# Patient Record
Sex: MALE | Race: WHITE | NOT HISPANIC OR LATINO | Employment: OTHER | URBAN - METROPOLITAN AREA
[De-identification: names, ages, dates, MRNs, and addresses within clinical notes are randomized per-mention and may not be internally consistent; named-entity substitution may affect disease eponyms.]

---

## 2023-03-14 LAB
ALANINE AMINOTRANSFERASE (SGPT) (U/L) IN SER/PLAS: 6 U/L (ref 10–52)
ALBUMIN (G/DL) IN SER/PLAS: 3.9 G/DL (ref 3.4–5)
ALKALINE PHOSPHATASE (U/L) IN SER/PLAS: 106 U/L (ref 33–136)
ANION GAP IN SER/PLAS: 14 MMOL/L (ref 10–20)
ASPARTATE AMINOTRANSFERASE (SGOT) (U/L) IN SER/PLAS: 25 U/L (ref 9–39)
BASOPHILS (10*3/UL) IN BLOOD BY AUTOMATED COUNT: 0.06 X10E9/L (ref 0–0.1)
BASOPHILS/100 LEUKOCYTES IN BLOOD BY AUTOMATED COUNT: 0.9 % (ref 0–2)
BILIRUBIN TOTAL (MG/DL) IN SER/PLAS: 0.7 MG/DL (ref 0–1.2)
CALCIDIOL (25 OH VITAMIN D3) (NG/ML) IN SER/PLAS: 38 NG/ML
CALCIUM (MG/DL) IN SER/PLAS: 9.5 MG/DL (ref 8.6–10.3)
CARBON DIOXIDE, TOTAL (MMOL/L) IN SER/PLAS: 26 MMOL/L (ref 21–32)
CHLORIDE (MMOL/L) IN SER/PLAS: 101 MMOL/L (ref 98–107)
CREATININE (MG/DL) IN SER/PLAS: 1.24 MG/DL (ref 0.5–1.3)
EOSINOPHILS (10*3/UL) IN BLOOD BY AUTOMATED COUNT: 0.7 X10E9/L (ref 0–0.4)
EOSINOPHILS/100 LEUKOCYTES IN BLOOD BY AUTOMATED COUNT: 10.9 % (ref 0–6)
ERYTHROCYTE DISTRIBUTION WIDTH (RATIO) BY AUTOMATED COUNT: 12 % (ref 11.5–14.5)
ERYTHROCYTE MEAN CORPUSCULAR HEMOGLOBIN CONCENTRATION (G/DL) BY AUTOMATED: 31 G/DL (ref 32–36)
ERYTHROCYTE MEAN CORPUSCULAR VOLUME (FL) BY AUTOMATED COUNT: 103 FL (ref 80–100)
ERYTHROCYTES (10*6/UL) IN BLOOD BY AUTOMATED COUNT: 3.17 X10E12/L (ref 4.5–5.9)
GFR MALE: 59 ML/MIN/1.73M2
GLUCOSE (MG/DL) IN SER/PLAS: 89 MG/DL (ref 74–99)
HEMATOCRIT (%) IN BLOOD BY AUTOMATED COUNT: 32.6 % (ref 41–52)
HEMOGLOBIN (G/DL) IN BLOOD: 10.1 G/DL (ref 13.5–17.5)
IMMATURE GRANULOCYTES/100 LEUKOCYTES IN BLOOD BY AUTOMATED COUNT: 0.2 % (ref 0–0.9)
LEUKOCYTES (10*3/UL) IN BLOOD BY AUTOMATED COUNT: 6.4 X10E9/L (ref 4.4–11.3)
LYMPHOCYTES (10*3/UL) IN BLOOD BY AUTOMATED COUNT: 0.72 X10E9/L (ref 0.8–3)
LYMPHOCYTES/100 LEUKOCYTES IN BLOOD BY AUTOMATED COUNT: 11.2 % (ref 13–44)
MONOCYTES (10*3/UL) IN BLOOD BY AUTOMATED COUNT: 0.64 X10E9/L (ref 0.05–0.8)
MONOCYTES/100 LEUKOCYTES IN BLOOD BY AUTOMATED COUNT: 10 % (ref 2–10)
NEUTROPHILS (10*3/UL) IN BLOOD BY AUTOMATED COUNT: 4.3 X10E9/L (ref 1.6–5.5)
NEUTROPHILS/100 LEUKOCYTES IN BLOOD BY AUTOMATED COUNT: 66.8 % (ref 40–80)
PLATELETS (10*3/UL) IN BLOOD AUTOMATED COUNT: 442 X10E9/L (ref 150–450)
POTASSIUM (MMOL/L) IN SER/PLAS: 4 MMOL/L (ref 3.5–5.3)
PROTEIN TOTAL: 7.1 G/DL (ref 6.4–8.2)
SODIUM (MMOL/L) IN SER/PLAS: 137 MMOL/L (ref 136–145)
UREA NITROGEN (MG/DL) IN SER/PLAS: 21 MG/DL (ref 6–23)

## 2023-03-15 LAB
ALLERGEN ANIMAL: CAT DANDER IGE (KU/L): 0.22 KU/L
ALLERGEN ANIMAL: DOG DANDER IGE (KU/L): 0.47 KU/L
ALLERGEN GRASS: BERMUDA GRASS (CYNODON DACTYLON) IGE (KU/L): 0.65 KU/L
ALLERGEN GRASS: JOHNSON GRASS (SORGHUM HALEPENSE) IGE (KU/L): 0.64 KU/L
ALLERGEN GRASS: MEADOW GRASS, KENTUCKY BLUE (POA PRATENSIS )IGE (KU/L): 0.68 KU/L
ALLERGEN GRASS: TIMOTHY GRASS (PHLEUM PRATENSE) IGE (KU/L): 0.65 KU/L
ALLERGEN INSECT: COCKROACH IGE: 0.57 KU/L
ALLERGEN MICROORGANISM: ALTERNARIA ALTERNATA IGE (KU/L): 0.44 KU/L
ALLERGEN MICROORGANISM: ASPERGILLUS FUMIGATUS IGE (KU/L): 0.34 KU/L
ALLERGEN MICROORGANISM: CLADOSPORIUM HERBARUM IGE (KU/L): 0.53 KU/L
ALLERGEN MICROORGANISM: PENICILLIUM CHRYSOGENUM (P. NOTATUM) IGE (KU/L): 0.3 KU/L
ALLERGEN MITE: DERMATOPHAGOIDES FARINAE (HOUSE DUST MITE) IGE (KU/L): 0.73 KU/L
ALLERGEN MITE: DERMATOPHAGOIDES PTERONYSSINUS (HOUSE DUST MITE) IGE (KU/L): 0.83 KU/L
ALLERGEN TREE: BOX-ELDER (ACER NEGUNDO) IGE (KU/L): 0.47 KU/L
ALLERGEN TREE: COMMON SILVER BIRCH (BETULA VERRUCOSA) IGE (KU/L): 0.39 KU/L
ALLERGEN TREE: COTTONWOOD (POPULUS DELTOIDES) IGE (KU/L): 0.96 KU/L
ALLERGEN TREE: ELM (ULMUS AMERICANA) IGE (KU/L): 0.59 KU/L
ALLERGEN TREE: MAPLE LEAF SYCAMORE, LONDON PLANE IGE (KU/L): 0.44 KU/L
ALLERGEN TREE: MOUNTAIN JUNIPER (JUNIPERUS SABINOIDES) IGE (KU/L): 0.5 KU/L
ALLERGEN TREE: MULBERRY (MORUS ALBA) IGE (KU/L): 0.48 KU/L
ALLERGEN TREE: OAK (QUERCUS ALBA) IGE (KU/L): 0.37 KU/L
ALLERGEN TREE: PECAN, HICKORY (CARYA PECAN) IGE (KU/L): 0.36 KU/L
ALLERGEN TREE: WALNUT IGE: 0.45 KU/L
ALLERGEN TREE: WHITE ASH (FRAXINUS AMERICANA) IGE (KU/L): 0.46 KU/L
ALLERGEN WEED: COMMON PIGWEED (AMARANTHUS RETROFLEXUS) IGE (KU/L): 0.23 KU/L
ALLERGEN WEED: COMMON RAGWEED (AMB. ARTEMISIIFOLIA/A. ELATIOR) IGE (KU/L): 0.26 KU/L
ALLERGEN WEED: GOOSEFOOT, LAMB'S QUARTERS (CHENOPODIUM ALBUM) IGE (KU/L): 0.69 KU/L
ALLERGEN WEED: PLANTAIN (ENGLISH), RIBWORT (PLANTAGO LANCEOLATA) IGE (KU/L): 0.47 KU/L
ALLERGEN WEED: PRICKLY SALTWORT/RUSSIAN THISTLE (SALSOLA KALI) IGE (KU/L): 0.54 KU/L
ALLERGEN WEED: SHEEP SORREL (RUMEX ACETOSELLA) IGE (KU/L): 0.34 KU/L
ANA PATTERN: ABNORMAL
ANA TITER: ABNORMAL
ANTI-CENTROMERE: <0.2 AI
ANTI-CHROMATIN: <0.2 AI
ANTI-DNA (DS): <1 IU/ML
ANTI-JO-1 IGG: <0.2 AI
ANTI-NUCLEAR ANTIBODY (ANA): POSITIVE
ANTI-RIBOSOMAL P: <0.2 AI
ANTI-RNP: 2.2 AI
ANTI-SCL-70: <0.2 AI
ANTI-SM/RNP: <0.2 AI
ANTI-SM: <0.2 AI
ANTI-SSA: <0.2 AI
ANTI-SSB: <0.2 AI
IMMUNOCAP IGE: >5000 KU/L (ref 0–214)
IMMUNOCAP INTERPRETATION: ABNORMAL
THYROPEROXIDASE AB (IU/ML) IN SER/PLAS: 42 IU/ML

## 2023-03-16 LAB
IRON (UG/DL) IN SER/PLAS: 50 UG/DL (ref 35–150)
IRON BINDING CAPACITY (UG/DL) IN SER/PLAS: 377 UG/DL (ref 240–445)
IRON SATURATION (%) IN SER/PLAS: 13 % (ref 25–45)

## 2023-03-17 LAB
COBALAMIN (VITAMIN B12) (PG/ML) IN SER/PLAS: 354 PG/ML (ref 211–911)
FOLATE (NG/ML) IN SER/PLAS: 14 NG/ML
THYROGLOBULIN AB (IU/ML) IN SER/PLAS: <0.9 IU/ML (ref 0–4)

## 2023-03-20 LAB — TRYPTASE: 9.2 MCG/L

## 2023-03-21 LAB — URTICARIA-INDUCING ACTIVITY: 4.9

## 2023-09-20 ENCOUNTER — HOSPITAL ENCOUNTER (OUTPATIENT)
Dept: DATA CONVERSION | Facility: HOSPITAL | Age: 78
End: 2023-09-20
Attending: INTERNAL MEDICINE | Admitting: INTERNAL MEDICINE
Payer: MEDICARE

## 2023-09-20 DIAGNOSIS — I47.20 VENTRICULAR TACHYCARDIA, UNSPECIFIED (MULTI): ICD-10-CM

## 2023-09-20 DIAGNOSIS — Z95.810 PRESENCE OF AUTOMATIC (IMPLANTABLE) CARDIAC DEFIBRILLATOR: ICD-10-CM

## 2023-09-20 LAB
ANION GAP IN SER/PLAS: 11 MMOL/L (ref 10–20)
ATRIAL RATE: 72 BPM
CALCIUM (MG/DL) IN SER/PLAS: 9.4 MG/DL (ref 8.6–10.3)
CARBON DIOXIDE, TOTAL (MMOL/L) IN SER/PLAS: 26 MMOL/L (ref 21–32)
CHLORIDE (MMOL/L) IN SER/PLAS: 105 MMOL/L (ref 98–107)
CREATININE (MG/DL) IN SER/PLAS: 1.58 MG/DL (ref 0.5–1.3)
ERYTHROCYTE DISTRIBUTION WIDTH (RATIO) BY AUTOMATED COUNT: 14.4 % (ref 11.5–14.5)
ERYTHROCYTE MEAN CORPUSCULAR HEMOGLOBIN CONCENTRATION (G/DL) BY AUTOMATED: 31.5 G/DL (ref 32–36)
ERYTHROCYTE MEAN CORPUSCULAR VOLUME (FL) BY AUTOMATED COUNT: 93 FL (ref 80–100)
ERYTHROCYTES (10*6/UL) IN BLOOD BY AUTOMATED COUNT: 3.71 X10E12/L (ref 4.5–5.9)
GFR MALE: 44 ML/MIN/1.73M2
GLUCOSE (MG/DL) IN SER/PLAS: 100 MG/DL (ref 74–99)
HEMATOCRIT (%) IN BLOOD BY AUTOMATED COUNT: 34.6 % (ref 41–52)
HEMOGLOBIN (G/DL) IN BLOOD: 10.9 G/DL (ref 13.5–17.5)
LEUKOCYTES (10*3/UL) IN BLOOD BY AUTOMATED COUNT: 7.2 X10E9/L (ref 4.4–11.3)
P AXIS: 114 DEGREES
PLATELETS (10*3/UL) IN BLOOD AUTOMATED COUNT: 507 X10E9/L (ref 150–450)
POTASSIUM (MMOL/L) IN SER/PLAS: 4 MMOL/L (ref 3.5–5.3)
Q ONSET: 187 MS
QRS COUNT: 11 BEATS
QRS DURATION: 192 MS
QT INTERVAL: 538 MS
QTC CALCULATION(BAZETT): 589 MS
QTC FREDERICIA: 572 MS
R AXIS: -67 DEGREES
SODIUM (MMOL/L) IN SER/PLAS: 138 MMOL/L (ref 136–145)
T AXIS: 79 DEGREES
T OFFSET: 456 MS
UREA NITROGEN (MG/DL) IN SER/PLAS: 23 MG/DL (ref 6–23)
VENTRICULAR RATE: 72 BPM

## 2023-09-29 VITALS — HEIGHT: 74 IN | BODY MASS INDEX: 24.45 KG/M2 | WEIGHT: 190.48 LBS

## 2023-09-30 NOTE — H&P
History of Present Illness:   History Present Illness:  Reason for surgery: device at replacement   HPI:    He has permanent a fib.  His device is at replacement.    Allergies:        Allergies:  ·  chlorhexidine topical : Other  ·  spironolactone : Other    Home Medication Review:   Home Medications Reviewed: yes     Impression/Procedure:   ·  Impression and Planned Procedure: Device at replacement; ventricular tachycardia       ERAS (Enhanced Recovery After Surgery):  ·  ERAS Patient: no     Review of Systems:   Review of Systems:  Constitutional: NEGATIVE: Fever, Chills     Respiratory: NEGATIVE: Dry Cough, Productive Cough     Cardiac: NEGATIVE: Chest Pain, Dyspnea on Exertion,  Orthopnea, Palpitations, Syncope     All Other Systems: All other systems reviewed and  are negative       Physical Exam by System:    Constitutional: Well developed, awake/alert/oriented  x3, no distress, alert and cooperative   Eyes: EOMI, clear sclera   ENMT: mucous membranes moist, no apparent injury,  no lesions seen   Head/Neck: Neck supple, no thyromegaly, No JVD, trachea  midline   Respiratory/Thorax: Patent airways, CTA bilaterally,  no wheezing, normal breath sounds with good chest expansion, thorax symmetric   Cardiovascular: Laterally displaced  PMI, regular,  rate and rhythm, , normal S 1and S 2, no murmurs, 2+ equal pulses radial, brachial, DP pulses   Gastrointestinal: Nondistended, BS +,  no bruits,  soft, non-tender, no guarding   Genitourinary: deferred   Musculoskeletal: ROM intact, no joint swelling, normal  strength   Extremities: normal extremities; no cyanosis, edema,  or contusions, no clubbing   Neurological: alert and oriented x3, intact senses,  motor normal strength; normal gait   Breast: deferred   Lymphatic: No cervical lymphadenopathy   Psychological: Appropriate mood and behavior   Skin: Warm and dry, no lesions, no rashes; normal  device pocket     Airway/Sedation Assessment:  ·  Emotional Status calm    ·  Neurologic alert & oriented x 3   ·  Respiratory clear to auscultation   ·  Cardiovascular irregular   ·  GI/ soft, nontender     · Pulses present: Pedal Left, Pedal Right, Radial Left, Radial Right     ·  Mouth Opening OK yes   ·  Neck Flexibility OK yes   ·  Loose Teeth no   ·  Oropharyngeal Classification Class II   ·  ASA PS Classification ASA III   ·  Sedation Plan moderate sedation     Consent:   COVID-19 Consent:  ·  COVID-19 Risk Consent Surgeon has reviewed key risks related to the risk of darren COVID-19 and if they contract COVID-19 what the risks are.       Electronic Signatures:  Awa Gasca)  (Signed 20-Sep-2023 08:56)   Authored: History of Present Illness, Allergies, Home  Medication Review, Impression/Procedure, ERAS, Review of Systems, Physical Exam, Consent, Note Completion      Last Updated: 20-Sep-2023 08:56 by Awa Gasca)

## 2023-10-16 DIAGNOSIS — I25.10 ARTERIOSCLEROSIS OF CORONARY ARTERY: ICD-10-CM

## 2023-10-16 PROBLEM — I25.2 PAST MYOCARDIAL INFARCTION: Status: ACTIVE | Noted: 2023-10-16

## 2023-10-16 PROBLEM — I25.5 ISCHEMIC CARDIOMYOPATHY: Status: ACTIVE | Noted: 2023-10-16

## 2023-10-16 PROBLEM — E78.5 HYPERLIPIDEMIA: Status: ACTIVE | Noted: 2023-10-16

## 2023-10-16 PROBLEM — I49.5 SINUS NODE DYSFUNCTION (MULTI): Status: ACTIVE | Noted: 2023-10-16

## 2023-10-16 PROBLEM — R06.00 DYSPNEA: Status: ACTIVE | Noted: 2023-10-16

## 2023-10-16 PROBLEM — R53.1 WEAKNESS: Status: ACTIVE | Noted: 2023-10-16

## 2023-10-16 PROBLEM — I48.92 ATRIAL FLUTTER (MULTI): Status: ACTIVE | Noted: 2023-10-16

## 2023-10-16 PROBLEM — Z95.818 PRESENCE OF WATCHMAN LEFT ATRIAL APPENDAGE CLOSURE DEVICE: Status: ACTIVE | Noted: 2023-10-16

## 2023-10-16 PROBLEM — I51.3 LV (LEFT VENTRICULAR) MURAL THROMBUS: Status: ACTIVE | Noted: 2023-10-16

## 2023-10-16 PROBLEM — I50.20 SYSTOLIC CONGESTIVE HEART FAILURE (MULTI): Status: ACTIVE | Noted: 2023-10-16

## 2023-10-16 PROBLEM — Z95.810 ICD (IMPLANTABLE CARDIOVERTER-DEFIBRILLATOR) IN PLACE: Status: ACTIVE | Noted: 2023-10-16

## 2023-10-16 PROBLEM — I44.2 CHB (COMPLETE HEART BLOCK) (MULTI): Status: ACTIVE | Noted: 2023-10-16

## 2023-10-16 PROBLEM — I48.91 ATRIAL FIBRILLATION (MULTI): Status: ACTIVE | Noted: 2023-10-16

## 2023-10-16 PROBLEM — I50.22 CHRONIC SYSTOLIC CONGESTIVE HEART FAILURE, NYHA CLASS 2 (MULTI): Status: ACTIVE | Noted: 2023-10-16

## 2023-10-16 PROBLEM — N18.32 STAGE 3B CHRONIC KIDNEY DISEASE (MULTI): Status: ACTIVE | Noted: 2023-10-16

## 2023-10-16 PROBLEM — R76.8 ELEVATED IGE LEVEL: Status: ACTIVE | Noted: 2023-10-16

## 2023-10-16 PROBLEM — D64.9 ANEMIA: Status: ACTIVE | Noted: 2023-10-16

## 2023-10-16 PROBLEM — Z85.46 HISTORY OF PROSTATE CANCER: Status: ACTIVE | Noted: 2023-10-16

## 2023-10-16 PROBLEM — Z00.6 RESEARCH STUDY PATIENT: Status: ACTIVE | Noted: 2023-10-16

## 2023-10-16 PROBLEM — L20.9 ATOPIC DERMATITIS: Status: ACTIVE | Noted: 2023-10-16

## 2023-10-16 RX ORDER — ROSUVASTATIN CALCIUM 10 MG/1
10 TABLET, COATED ORAL DAILY
COMMUNITY
Start: 2023-09-12

## 2023-10-16 RX ORDER — VALSARTAN 40 MG/1
1 TABLET ORAL DAILY
COMMUNITY
Start: 2022-09-12

## 2023-10-16 RX ORDER — DUPILUMAB 300 MG/2ML
300 INJECTION, SOLUTION SUBCUTANEOUS
COMMUNITY

## 2023-10-16 RX ORDER — MONTELUKAST SODIUM 10 MG/1
10 TABLET ORAL NIGHTLY
COMMUNITY
Start: 2023-08-10

## 2023-10-16 RX ORDER — ZOLPIDEM TARTRATE 10 MG/1
10 TABLET ORAL NIGHTLY PRN
COMMUNITY
Start: 2023-07-31

## 2023-10-16 RX ORDER — EPLERENONE 25 MG/1
1 TABLET, FILM COATED ORAL DAILY
COMMUNITY
Start: 2022-06-30

## 2023-10-16 RX ORDER — OMEPRAZOLE 40 MG/1
1 CAPSULE, DELAYED RELEASE ORAL DAILY
COMMUNITY
Start: 2022-04-26

## 2023-10-16 RX ORDER — NITROGLYCERIN 0.4 MG/1
0.4 TABLET SUBLINGUAL EVERY 5 MIN PRN
COMMUNITY
Start: 2014-02-12 | End: 2023-10-16 | Stop reason: SDUPTHER

## 2023-10-16 RX ORDER — LATANOPROST 50 UG/ML
1 SOLUTION/ DROPS OPHTHALMIC NIGHTLY
COMMUNITY

## 2023-10-16 RX ORDER — TRAMADOL HYDROCHLORIDE 50 MG/1
50 TABLET ORAL EVERY 6 HOURS PRN
COMMUNITY
Start: 2023-08-23

## 2023-10-16 RX ORDER — CITALOPRAM 20 MG/1
30 TABLET, FILM COATED ORAL DAILY
COMMUNITY
Start: 2023-09-05

## 2023-10-16 RX ORDER — DONEPEZIL HYDROCHLORIDE 5 MG/1
1 TABLET, FILM COATED ORAL 2 TIMES DAILY
COMMUNITY

## 2023-10-16 RX ORDER — IPRATROPIUM BROMIDE 42 UG/1
2 SPRAY, METERED NASAL 3 TIMES DAILY
COMMUNITY

## 2023-10-16 RX ORDER — CARBIDOPA AND LEVODOPA 25; 100 MG/1; MG/1
1 TABLET, EXTENDED RELEASE ORAL 4 TIMES DAILY
COMMUNITY
Start: 2022-09-19

## 2023-10-16 RX ORDER — METOPROLOL SUCCINATE 25 MG/1
1 TABLET, EXTENDED RELEASE ORAL DAILY
COMMUNITY
Start: 2022-09-12 | End: 2023-11-08 | Stop reason: SDUPTHER

## 2023-10-16 RX ORDER — ASPIRIN 81 MG/1
81 TABLET ORAL EVERY OTHER DAY
COMMUNITY

## 2023-10-16 RX ORDER — FUROSEMIDE 20 MG/1
20 TABLET ORAL
COMMUNITY

## 2023-10-16 RX ORDER — RIVAROXABAN 20 MG/1
20 TABLET, FILM COATED ORAL
COMMUNITY
Start: 2023-08-27

## 2023-10-16 RX ORDER — LEVOTHYROXINE SODIUM 100 UG/1
1 TABLET ORAL DAILY
COMMUNITY
Start: 2022-09-12

## 2023-10-16 RX ORDER — BUSPIRONE HYDROCHLORIDE 15 MG/1
15 TABLET ORAL 3 TIMES DAILY
COMMUNITY
Start: 2023-09-12

## 2023-10-17 RX ORDER — NITROGLYCERIN 0.4 MG/1
0.4 TABLET SUBLINGUAL EVERY 5 MIN PRN
Qty: 90 TABLET | Refills: 3 | Status: SHIPPED | OUTPATIENT
Start: 2023-10-17

## 2023-10-24 ENCOUNTER — TELEPHONE (OUTPATIENT)
Dept: CARDIOLOGY | Facility: CLINIC | Age: 78
End: 2023-10-24
Payer: MEDICARE

## 2023-10-24 NOTE — TELEPHONE ENCOUNTER
Patient phoned left message stating having some shortness of breathe. No other information   Phone patient back left message to return call.

## 2023-10-25 NOTE — TELEPHONE ENCOUNTER
Patient left message on nursing line just states over and over having sob having sob. Phoned patient back when straight to . Left message to return call for more information.

## 2023-10-26 NOTE — TELEPHONE ENCOUNTER
Spoke with patient who states he has SOB on exertion on walking and taking steps. Patient thinks his heart is getting weaker.  Patient is post status with change of defibrillator 09/20/23. Patient states the SOB has been going on for over a month. Thinks some meds need changed.  He has an echo scheduled 11/06/2023. No other cardiac complaints at this time    Please advise

## 2023-11-01 NOTE — TELEPHONE ENCOUNTER
Patient called. States he does not appreciate the delay in return call. Advised we did go to a new computer system, so unfortunately there have been delays. Patient states he wants to see another cardiologist and mentioned Dr. Alexander. Will update Dr. Goff and forward this not to DR. Alexander as requested.

## 2023-11-03 NOTE — TELEPHONE ENCOUNTER
Spoke with patient advised he will need to keep his Echo appt and then we will determine with Dr. Janeen Goff MD what the next steps are based off the results. He notes he is only having sob but denies any edema at this time. I advised if his symptoms worsen to always report to the ER / call 911. He verbalized understanding.       Transferred to [facility] for follow up.  To follow up when Echo completed and see what Dr. Janeen Goff MD would like to do.

## 2023-11-06 ENCOUNTER — APPOINTMENT (OUTPATIENT)
Dept: CARDIOLOGY | Facility: CLINIC | Age: 78
End: 2023-11-06
Payer: MEDICARE

## 2023-11-06 ENCOUNTER — HOSPITAL ENCOUNTER (OUTPATIENT)
Dept: CARDIOLOGY | Facility: CLINIC | Age: 78
Discharge: HOME | End: 2023-11-06
Payer: MEDICARE

## 2023-11-06 ENCOUNTER — TELEPHONE (OUTPATIENT)
Dept: CARDIOLOGY | Facility: CLINIC | Age: 78
End: 2023-11-06
Payer: MEDICARE

## 2023-11-06 VITALS — WEIGHT: 186 LBS | BODY MASS INDEX: 23.87 KG/M2 | HEIGHT: 74 IN

## 2023-11-06 DIAGNOSIS — R06.00 DYSPNEA, UNSPECIFIED TYPE: ICD-10-CM

## 2023-11-06 DIAGNOSIS — I51.3 LV (LEFT VENTRICULAR) MURAL THROMBUS: ICD-10-CM

## 2023-11-06 DIAGNOSIS — I50.9 CONGESTIVE HEART FAILURE, UNSPECIFIED HF CHRONICITY, UNSPECIFIED HEART FAILURE TYPE (MULTI): ICD-10-CM

## 2023-11-06 DIAGNOSIS — I51.3 LV (LEFT VENTRICULAR) MURAL THROMBUS: Primary | ICD-10-CM

## 2023-11-06 LAB
EJECTION FRACTION APICAL 4 CHAMBER: 20.7
LEFT VENTRICLE INTERNAL DIMENSION DIASTOLE: 6 (ref 3.5–6)
LEFT VENTRICULAR OUTFLOW TRACT DIAMETER: 2.3

## 2023-11-06 PROCEDURE — 93308 TTE F-UP OR LMTD: CPT | Performed by: INTERNAL MEDICINE

## 2023-11-06 PROCEDURE — 2500000004 HC RX 250 GENERAL PHARMACY W/ HCPCS (ALT 636 FOR OP/ED): Performed by: INTERNAL MEDICINE

## 2023-11-06 PROCEDURE — 93308 TTE F-UP OR LMTD: CPT

## 2023-11-06 RX ADMIN — HUMAN ALBUMIN MICROSPHERES AND PERFLUTREN 0.7 ML: 10; .22 INJECTION, SOLUTION INTRAVENOUS at 12:52

## 2023-11-07 ENCOUNTER — TELEPHONE (OUTPATIENT)
Dept: CARDIOLOGY | Facility: CLINIC | Age: 78
End: 2023-11-07
Payer: MEDICARE

## 2023-11-07 NOTE — TELEPHONE ENCOUNTER
Echo reviewed per Dr Goff. Revealed large pleural effusion. Per Dr Goff , patient is to report to er for evaluation. Patient notified. Verbalized understanding.

## 2023-11-07 NOTE — TELEPHONE ENCOUNTER
Patient and wife presented to office as walk-in. Patient had several concerns with the timing and management of the care/symptoms and necessary communications from the office to him. Patient reports he did go to the ER, they had no information on him but did end up getting him set up to have pleural effusion treated which will hopefully improve his SOB. Patient reports has procedure(thoracentesis) scheduled this Friday then a follow up with dr. Calix on 11/30/2023. Patient reports he would like to maintain his OV at this time with Dr. Janeen Goff MD 12/20/2023.

## 2023-11-07 NOTE — TELEPHONE ENCOUNTER
Per Dr Goff.  Echo revealed Large pleural effusion.  Per Dr Goff. Patient is to report to er for evaluation.  Spoke to patient verbalized understanding.  Patient will report to er.

## 2023-11-08 DIAGNOSIS — I50.22 CHRONIC SYSTOLIC CONGESTIVE HEART FAILURE, NYHA CLASS 2 (MULTI): ICD-10-CM

## 2023-11-08 DIAGNOSIS — I25.10 ARTERIOSCLEROSIS OF CORONARY ARTERY: ICD-10-CM

## 2023-11-08 RX ORDER — METOPROLOL SUCCINATE 25 MG/1
25 TABLET, EXTENDED RELEASE ORAL DAILY
Qty: 90 TABLET | Refills: 3 | Status: SHIPPED | OUTPATIENT
Start: 2023-11-08 | End: 2024-11-07

## 2023-11-22 ENCOUNTER — TELEPHONE (OUTPATIENT)
Dept: CARDIOLOGY | Facility: CLINIC | Age: 78
End: 2023-11-22
Payer: MEDICARE

## 2023-11-22 NOTE — TELEPHONE ENCOUNTER
Patient called into office that he has been having an increase in SOB and weakness. States that he is unable to work 10 steps and he is just tired and unable to breath. States that someone else (another DrSophia) mentioned that he might benefit from using Entresto to help EF (20% per last echo) and his symptoms.    To Dr. Janeen Goff MD for review

## 2023-11-27 NOTE — TELEPHONE ENCOUNTER
Spoke with aptient advised of the above. He states that he was to late and he is changing doctors as he is not happy with our office. He states he is going to [facility].       TO MD JAYDEN Wong

## 2023-12-20 ENCOUNTER — APPOINTMENT (OUTPATIENT)
Dept: CARDIOLOGY | Facility: CLINIC | Age: 78
End: 2023-12-20
Payer: MEDICARE

## 2023-12-22 ENCOUNTER — HOSPITAL ENCOUNTER (OUTPATIENT)
Dept: CARDIOLOGY | Facility: HOSPITAL | Age: 78
Discharge: HOME | End: 2023-12-22
Payer: MEDICARE

## 2023-12-22 DIAGNOSIS — Z95.810 PRESENCE OF AUTOMATIC CARDIOVERTER/DEFIBRILLATOR (AICD): Primary | ICD-10-CM

## 2023-12-22 DIAGNOSIS — I47.29 VENTRICULAR TACHYCARDIA (PAROXYSMAL) (MULTI): ICD-10-CM

## 2023-12-22 DIAGNOSIS — Z95.810 PRESENCE OF AUTOMATIC CARDIOVERTER/DEFIBRILLATOR (AICD): ICD-10-CM

## 2023-12-22 PROCEDURE — 93295 DEV INTERROG REMOTE 1/2/MLT: CPT | Performed by: INTERNAL MEDICINE

## 2023-12-22 PROCEDURE — 93296 REM INTERROG EVL PM/IDS: CPT

## 2024-01-06 ENCOUNTER — APPOINTMENT (OUTPATIENT)
Dept: CARDIOLOGY | Facility: CLINIC | Age: 79
End: 2024-01-06
Payer: MEDICARE

## 2024-01-18 ENCOUNTER — DOCUMENTATION (OUTPATIENT)
Dept: RESEARCH | Age: 79
End: 2024-01-18
Payer: MEDICARE

## 2024-01-18 ENCOUNTER — DOCUMENTATION (OUTPATIENT)
Dept: CARDIOLOGY | Facility: HOSPITAL | Age: 79
End: 2024-01-18
Payer: MEDICARE

## 2024-01-22 ENCOUNTER — ANCILLARY PROCEDURE (OUTPATIENT)
Dept: CARDIOLOGY | Facility: CLINIC | Age: 79
End: 2024-01-22
Payer: MEDICARE

## 2024-01-22 ENCOUNTER — OFFICE VISIT (OUTPATIENT)
Dept: CARDIOLOGY | Facility: CLINIC | Age: 79
End: 2024-01-22
Payer: MEDICARE

## 2024-01-22 VITALS
HEART RATE: 86 BPM | HEIGHT: 74 IN | DIASTOLIC BLOOD PRESSURE: 70 MMHG | WEIGHT: 188.7 LBS | BODY MASS INDEX: 24.22 KG/M2 | SYSTOLIC BLOOD PRESSURE: 126 MMHG

## 2024-01-22 DIAGNOSIS — I47.29 PAROXYSMAL VENTRICULAR TACHYCARDIA (MULTI): ICD-10-CM

## 2024-01-22 DIAGNOSIS — E78.2 MIXED HYPERLIPIDEMIA: ICD-10-CM

## 2024-01-22 DIAGNOSIS — Z95.810 ICD (IMPLANTABLE CARDIOVERTER-DEFIBRILLATOR) IN PLACE: ICD-10-CM

## 2024-01-22 DIAGNOSIS — I50.22 CHRONIC SYSTOLIC CONGESTIVE HEART FAILURE, NYHA CLASS 2 (MULTI): ICD-10-CM

## 2024-01-22 DIAGNOSIS — I10 ESSENTIAL HYPERTENSION: ICD-10-CM

## 2024-01-22 DIAGNOSIS — I48.0 PAROXYSMAL ATRIAL FIBRILLATION (MULTI): ICD-10-CM

## 2024-01-22 DIAGNOSIS — I25.5 ISCHEMIC CARDIOMYOPATHY: ICD-10-CM

## 2024-01-22 DIAGNOSIS — I44.2 CHB (COMPLETE HEART BLOCK) (MULTI): ICD-10-CM

## 2024-01-22 DIAGNOSIS — I48.3 TYPICAL ATRIAL FLUTTER (MULTI): ICD-10-CM

## 2024-01-22 DIAGNOSIS — Z78.9 NEVER SMOKED TOBACCO: ICD-10-CM

## 2024-01-22 DIAGNOSIS — Z95.810 PRESENCE OF AUTOMATIC CARDIOVERTER/DEFIBRILLATOR (AICD): ICD-10-CM

## 2024-01-22 DIAGNOSIS — I25.10 ARTERIOSCLEROSIS OF CORONARY ARTERY: ICD-10-CM

## 2024-01-22 PROBLEM — G20.A1 PARKINSON DISEASE (MULTI): Status: ACTIVE | Noted: 2023-05-24

## 2024-01-22 PROBLEM — I21.9 MYOCARDIAL INFARCTION (MULTI): Status: ACTIVE | Noted: 2024-01-22

## 2024-01-22 PROCEDURE — 1159F MED LIST DOCD IN RCRD: CPT | Performed by: INTERNAL MEDICINE

## 2024-01-22 PROCEDURE — 93290 INTERROG DEV EVAL ICPMS IP: CPT | Performed by: INTERNAL MEDICINE

## 2024-01-22 PROCEDURE — 93283 PRGRMG EVAL IMPLANTABLE DFB: CPT | Performed by: INTERNAL MEDICINE

## 2024-01-22 PROCEDURE — 99215 OFFICE O/P EST HI 40 MIN: CPT | Performed by: INTERNAL MEDICINE

## 2024-01-22 PROCEDURE — 3078F DIAST BP <80 MM HG: CPT | Performed by: INTERNAL MEDICINE

## 2024-01-22 PROCEDURE — 1126F AMNT PAIN NOTED NONE PRSNT: CPT | Performed by: INTERNAL MEDICINE

## 2024-01-22 PROCEDURE — 3074F SYST BP LT 130 MM HG: CPT | Performed by: INTERNAL MEDICINE

## 2024-01-22 RX ORDER — MIRTAZAPINE 15 MG/1
TABLET, FILM COATED ORAL
COMMUNITY
Start: 2024-01-02

## 2024-01-22 RX ORDER — TRAZODONE HYDROCHLORIDE 50 MG/1
50 TABLET ORAL
COMMUNITY
Start: 2023-02-01

## 2024-01-22 RX ORDER — TEMAZEPAM 15 MG/1
CAPSULE ORAL
COMMUNITY
Start: 2024-01-11

## 2024-01-22 RX ORDER — SACUBITRIL AND VALSARTAN 49; 51 MG/1; MG/1
TABLET, FILM COATED ORAL
COMMUNITY
Start: 2024-01-09

## 2024-01-22 RX ORDER — DAPAGLIFLOZIN 10 MG/1
10 TABLET, FILM COATED ORAL
COMMUNITY
Start: 2023-12-06

## 2024-01-22 ASSESSMENT — ENCOUNTER SYMPTOMS
NEUROLOGICAL NEGATIVE: 1
CONSTITUTIONAL NEGATIVE: 1
RESPIRATORY NEGATIVE: 1
CARDIOVASCULAR NEGATIVE: 1

## 2024-04-30 ENCOUNTER — HOSPITAL ENCOUNTER (OUTPATIENT)
Dept: CARDIOLOGY | Facility: HOSPITAL | Age: 79
Discharge: HOME | End: 2024-04-30
Payer: MEDICARE

## 2024-04-30 DIAGNOSIS — I44.2 CHB (COMPLETE HEART BLOCK) (MULTI): ICD-10-CM

## 2024-04-30 DIAGNOSIS — Z95.810 ICD (IMPLANTABLE CARDIOVERTER-DEFIBRILLATOR) IN PLACE: ICD-10-CM

## 2024-04-30 PROCEDURE — 93296 REM INTERROG EVL PM/IDS: CPT

## 2024-07-22 ENCOUNTER — APPOINTMENT (OUTPATIENT)
Dept: CARDIOLOGY | Facility: CLINIC | Age: 79
End: 2024-07-22
Payer: MEDICARE

## 2024-07-22 VITALS — SYSTOLIC BLOOD PRESSURE: 108 MMHG | DIASTOLIC BLOOD PRESSURE: 69 MMHG | HEART RATE: 76 BPM

## 2024-07-22 DIAGNOSIS — I25.5 ISCHEMIC CARDIOMYOPATHY: ICD-10-CM

## 2024-07-22 DIAGNOSIS — R53.83 OTHER FATIGUE: ICD-10-CM

## 2024-07-22 DIAGNOSIS — Z95.810 ICD (IMPLANTABLE CARDIOVERTER-DEFIBRILLATOR) IN PLACE: ICD-10-CM

## 2024-07-22 DIAGNOSIS — I48.0 PAROXYSMAL ATRIAL FIBRILLATION (MULTI): Primary | ICD-10-CM

## 2024-07-22 DIAGNOSIS — E78.2 MIXED HYPERLIPIDEMIA: ICD-10-CM

## 2024-07-22 DIAGNOSIS — I44.2 CHB (COMPLETE HEART BLOCK) (MULTI): ICD-10-CM

## 2024-07-22 DIAGNOSIS — I25.10 ARTERIOSCLEROSIS OF CORONARY ARTERY: ICD-10-CM

## 2024-07-22 DIAGNOSIS — I44.2 CHB (COMPLETE HEART BLOCK): ICD-10-CM

## 2024-07-22 DIAGNOSIS — Z71.89 ENCOUNTER FOR MEDICATION REVIEW AND COUNSELING: ICD-10-CM

## 2024-07-22 DIAGNOSIS — Z78.9 NEVER SMOKED TOBACCO: ICD-10-CM

## 2024-07-22 DIAGNOSIS — Z01.810 PREOP CARDIOVASCULAR EXAM: ICD-10-CM

## 2024-07-22 DIAGNOSIS — I50.22 CHRONIC SYSTOLIC CONGESTIVE HEART FAILURE, NYHA CLASS 2 (MULTI): ICD-10-CM

## 2024-07-22 DIAGNOSIS — I48.92 ATRIAL FLUTTER, UNSPECIFIED TYPE (MULTI): ICD-10-CM

## 2024-07-22 DIAGNOSIS — Z71.89 ENCOUNTER TO DISCUSS TREATMENT OPTIONS: ICD-10-CM

## 2024-07-22 DIAGNOSIS — R06.02 SHORTNESS OF BREATH: ICD-10-CM

## 2024-07-22 DIAGNOSIS — I10 ESSENTIAL HYPERTENSION: ICD-10-CM

## 2024-07-22 DIAGNOSIS — I44.7 LBBB (LEFT BUNDLE BRANCH BLOCK): ICD-10-CM

## 2024-07-22 PROCEDURE — 93283 PRGRMG EVAL IMPLANTABLE DFB: CPT | Performed by: INTERNAL MEDICINE

## 2024-07-22 RX ORDER — CEFAZOLIN SODIUM 2 G/100ML
2 INJECTION, SOLUTION INTRAVENOUS ONCE
OUTPATIENT
Start: 2024-07-22 | End: 2024-07-22

## 2024-07-22 RX ORDER — MUPIROCIN 20 MG/G
1 OINTMENT TOPICAL ONCE
OUTPATIENT
Start: 2024-07-22 | End: 2024-07-22

## 2024-07-22 RX ORDER — SODIUM CHLORIDE 9 MG/ML
100 INJECTION, SOLUTION INTRAVENOUS CONTINUOUS
OUTPATIENT
Start: 2024-07-22

## 2024-07-22 RX ORDER — EMPAGLIFLOZIN 10 MG/1
10 TABLET, FILM COATED ORAL
COMMUNITY
Start: 2024-02-26

## 2024-07-22 RX ORDER — ENOXAPARIN SODIUM 100 MG/ML
80 INJECTION SUBCUTANEOUS EVERY 12 HOURS
Qty: 3 EACH | Refills: 0 | Status: SHIPPED | OUTPATIENT
Start: 2024-07-22 | End: 2024-07-24

## 2024-07-22 ASSESSMENT — ENCOUNTER SYMPTOMS
SYNCOPE: 1
DYSPNEA ON EXERTION: 1

## 2024-07-22 NOTE — PATIENT INSTRUCTIONS
Continue same medications/treatment.  Patient educated on proper medication use.  Patient educated on risk factor modification.  Please bring any lab results from other providers/physicians to your next appointment.    Please bring all medicines, vitamins, and herbal supplements with you when you come to the office.    Prescriptions will not be filled unless you are compliant with your follow up appointments or have a follow up appointment scheduled as per instruction of your physician. Refills should be requested at the time of your visit.    ORDER 20-30mmHg compression stockings with zippers/open toed off amaz[employer].   SCHEDULE DC ICD upgrade to BIV ICD. Obtain labs 1 week prior to procedure. Orders are in the system.  HOLD Aspirin for 1 week prior to procedure  HOLD Jardiance for 4 days prior to procedure  HOLD Xarelto for 3 days prior to procedure. While you are holding your xarelto, you will be bridged with Lovenox.  3 days prior to procedure-Last dose of xarelto.  2 days prior to procedure-Lovenox AM and PM.  1 day prior to procedure-Lovenox AM only.  Day of- No Lovenox or xarelto.    MANI JOHNSON RN, AM SCRIBING FOR AND IN THE PRESENCE OF DR. RUBEN FARLEY MD, FACC, FACP, RS      MANI JOHNSON RN, AM SCRIBING FOR AND IN THE PRESENCE OF DR. RUBEN FARLEY MD, FACC, FACP, RS

## 2024-07-22 NOTE — PROGRESS NOTES
Chief Complaint:   Follow-up (Follow up. )     History Of Present Illness:    Arnav Ramirez is a 78 y.o. male presenting with follow-up.    He had syncope and ADHF.  [facility], LVEF 15 to 20%.  He is fatigued and short of breath.  He is also weak.  He ambulates with walker for balance, if unknown areas.  Last Recorded Vitals:  Vitals:    07/22/24 1508   BP: 108/69   BP Location: Left arm   Patient Position: Sitting   Pulse: 76       Past Medical History:  See List    Past Surgical History:  See List      Social History:  He reports that he has never smoked. He has never used smokeless tobacco. He reports that he does not currently use alcohol. He reports that he does not currently use drugs.    Family History:  Family History   Problem Relation Name Age of Onset    Colon cancer Mother      Other (cardiac disorder) Father      Heart attack Father      Coronary artery disease Brother      Heart attack Brother          Allergies:  Chloraprep clear [chlorhexidin-isopropyl alcohol], Chlorhexidine, and Spironolactone    Outpatient Medications:  Current Outpatient Medications   Medication Instructions    aspirin 81 mg, oral, Every other day    busPIRone (BUSPAR) 15 mg, oral, 3 times daily    carbidopa-levodopa (Sinemet CR)  mg ER tablet 1 tablet, oral, 4 times daily    citalopram (CELEXA) 30 mg, oral, Daily    donepezil (Aricept) 5 mg tablet 1 tablet, oral, 2 times daily    Dupixent Pen 300 mg, subcutaneous, Every 14 days    eplerenone (Inspra) 25 mg tablet 1 tablet, oral, Daily    furosemide (LASIX) 20 mg, oral, One tablet every other day    ipratropium (Atrovent) 42 mcg (0.06 %) nasal spray 2 sprays, Each Nostril, 3 times daily    Jardiance 10 mg, oral, Daily RT    latanoprost (Xalatan) 0.005 % ophthalmic solution 1 drop, Both Eyes, Nightly    levothyroxine (Synthroid, Levoxyl) 100 mcg tablet 1 tablet, oral, Daily    metoprolol succinate XL (TOPROL-XL) 25 mg, oral, Daily    mirtazapine (Remeron) 15 mg tablet      nitroglycerin (NITROSTAT) 0.4 mg, sublingual, Every 5 min PRN    omeprazole (PriLOSEC) 40 mg DR capsule 1 capsule, oral, Daily    rosuvastatin (CRESTOR) 10 mg, oral, Daily    sacubitriL-valsartan (Entresto) 24-26 mg tablet Take 1 tablet by mouth 2 times a day.    traMADol (ULTRAM) 50 mg, oral, Every 6 hours PRN    Xarelto 20 mg, oral, Daily with evening meal   Review of Systems   Constitutional: Positive for malaise/fatigue.   Cardiovascular:  Positive for dyspnea on exertion and syncope. Negative for chest pain.   All other systems reviewed and are negative.        Physical Exam:  Constitutional:       General: Awake.      Appearance: Normal and healthy appearance. Well-developed and not in distress.   Neck:      Vascular: No JVR. JVD normal.   Pulmonary:      Effort: Pulmonary effort is normal.      Breath sounds: Normal breath sounds. No wheezing. No rhonchi. No rales.   Chest:      Chest wall: Not tender to palpatation.      Comments: Left sided device pocket- healed and well approximated. No swelling or hematoma      Cardiovascular:      PMI at left midclavicular line. Normal rate. Irregularly irregular rhythm. Normal S1. Normal S2.       Murmurs: There is no murmur.      No gallop.  No click. No rub.      Comments: Atrial fibrillation  Pulses:     Intact distal pulses.   Edema:     Peripheral edema absent.   Abdominal:      Tenderness: There is no abdominal tenderness.   Musculoskeletal: Normal range of motion.         General: No tenderness. Skin:     General: Skin is warm and dry.   Neurological:      General: No focal deficit present.      Mental Status: Alert and oriented to person, place and time.            Last Labs:  CBC -  Lab Results   Component Value Date    WBC 7.2 09/20/2023    HGB 10.9 (L) 09/20/2023    HCT 34.6 (L) 09/20/2023    MCV 93 09/20/2023     (H) 09/20/2023       CMP -  Lab Results   Component Value Date    CALCIUM 9.4 09/20/2023    PHOS 3.7 04/19/2022    PROT 7.1 03/14/2023     "ALBUMIN 3.9 03/14/2023    AST 25 03/14/2023    ALT 6 (L) 03/14/2023    ALKPHOS 106 03/14/2023    BILITOT 0.7 03/14/2023       LIPID PANEL -   No results found for: \"CHOL\", \"TRIG\", \"HDL\", \"CHHDL\", \"LDLF\", \"VLDL\", \"NHDL\"    RENAL FUNCTION PANEL -   Lab Results   Component Value Date    GLUCOSE 100 (H) 09/20/2023     09/20/2023    K 4.0 09/20/2023     09/20/2023    CO2 26 09/20/2023    ANIONGAP 11 09/20/2023    BUN 23 09/20/2023    CREATININE 1.58 (H) 09/20/2023    GFRMALE 44 (A) 09/20/2023    CALCIUM 9.4 09/20/2023    PHOS 3.7 04/19/2022    ALBUMIN 3.9 03/14/2023        Lab Results   Component Value Date     (H) 04/19/2022       Last Cardiology Tests:  ECG:  Today.  Atrial fibrillation.  Left bundle branch block.  Appropriate ventricular pacing.  Paced QT interval 410 ms.   ms.  Device check today Medtronic DD MB 1 D1 ICD.  All rhythms A-fib.  0 VT.  99% ventricular paced    Echo:  Transthoracic Echo (TTE) Limited 11/06/2023      Lab review: I have personally reviewed the laboratory result(s) see above    Assessment/Plan   Diagnoses and all orders for this visit:  Paroxysmal atrial fibrillation (Multi)  Atrial flutter, unspecified type (Multi)  CHB (complete heart block) (Multi)  Chronic systolic congestive heart failure, NYHA class 2 (Multi)  Essential hypertension  Arteriosclerosis of coronary artery  Mixed hyperlipidemia  ICD (implantable cardioverter-defibrillator) in place  Ischemic cardiomyopathy  Never smoked tobacco  Encounter for medication review and counseling  Encounter to discuss treatment options  Other fatigue  Shortness of breath        Lauren Bryant RN    Medtronic [id number] ICD. Reviewed device check in detail. Ordered device checks.  LV thrombus. Chronic. Stable. On Xarelto.   Permanent atrial fibrillation, s/p PVI in 2014 and status post watchman and [place] trial.  High risk medication amiodarone. Discontinued once AV node ablation performed.  Paced  ms.  Chronic " systolic heart failure. Remote MI. Combined nonischemic and ischemic cardiomyopathy. LVEF 15% by MUGA scan May 2022. New [place] Heart Association 3C chronic systolic heart failure currently his heart failure status has improved with optimal heart failure medications.  Given recurrent hospitalization, recommend upgrade to biventricular device.  Shared decision making performed.  [place] decision tool.  Extended time visit for discussion regarding preoperative cardiac evaluation.  Informed consent obtained.  MAC anesthesia for procedure given hypotension and difficulty lying flat.  Sinus helder dysfunction, complete heart block after AV node ablation. Appropriate pacing.   Syncope,chronic, stable, likely secondary to hypovolemia in the past. No recurrence. No arrhythmias by device check.  Noncardiac chest pain. Resolved.  Hypertension, stable, chronic. Episodes of hypotension limited Entresto in the past. Meds adjusted as per cardiology.  Coronary artery disease, chronic. Stable, s/p remote stents x 2, 1998. Remote MI. No symptoms. Reviewed medications. Continue medications. Refills discussed. Follows with cardiology.  CKD stage III. Follows with PCP for blood work.  Prostate Ca, s/p radiation seed implant 2010/  Orthostatic dizziness.  Start compression stockings 20 mmHg.  Prescription given.  AHA recommendations for exercise, diet, and behavioral modification reviewed with pt.     Counseling over 50% visit performed.  The patient, wife, and I discussed the mechanism of arrhythmia, atrial fibrillation, shared decision making,, decision tool, preoperative cardiac evaluation, upgrade to biventricular defibrillator, no DFT given history of LV thrombus, informed consent, indications for and types of medications, discussion if and what medication refills needed, treatment options, risks, benefits, and imponderables. American Heart Association lifestyle changes and behavioral modification discussed. All questions answered in  detail. Patient and wife appreciative of care.

## 2024-07-23 PROBLEM — R53.83 OTHER FATIGUE: Status: ACTIVE | Noted: 2024-07-22

## 2024-08-05 DIAGNOSIS — L20.89 OTHER ATOPIC DERMATITIS: Primary | ICD-10-CM

## 2024-08-06 RX ORDER — DUPILUMAB 300 MG/2ML
300 INJECTION, SOLUTION SUBCUTANEOUS
Qty: 4 ML | Refills: 0 | Status: SHIPPED
Start: 2024-08-06 | End: 2024-08-07 | Stop reason: SDUPTHER

## 2024-08-07 RX ORDER — DUPILUMAB 300 MG/2ML
300 INJECTION, SOLUTION SUBCUTANEOUS
Qty: 4 ML | Refills: 0 | Status: SHIPPED | OUTPATIENT
Start: 2024-08-07

## 2024-08-19 NOTE — PROGRESS NOTES
"  Subjective   Patient ID:   [id number]   Arnav Ramirez is a 79 y.o. male who presents for Follow-up.    Chief Complaint   Patient presents with    Follow-up      Patient presents for F/U of dermatitis on Dupixent.  Patient is accompanied by his wife.    Since last visit, 8-15-23, patient reports he is doing well on Dupixent and denies any SE.  Patient self-administers in his abdomen and denies any problems or issues at the injection site.    Objective   /67   Pulse 86   Ht 1.88 m (6' 2\")   BMI 24.23 kg/m²      Physical Exam  Constitutional:       Appearance: Normal appearance.   HENT:      Head: Normocephalic and atraumatic.      Right Ear: External ear normal. There is no impacted cerumen.      Left Ear: External ear normal. There is no impacted cerumen.      Nose: No congestion or rhinorrhea.   Eyes:      Extraocular Movements: Extraocular movements intact.      Conjunctiva/sclera: Conjunctivae normal.      Pupils: Pupils are equal, round, and reactive to light.   Cardiovascular:      Rate and Rhythm: Normal rate and regular rhythm.      Heart sounds: No murmur heard.     No friction rub. No gallop.   Pulmonary:      Effort: No respiratory distress.      Breath sounds: No wheezing, rhonchi or rales.   Skin:     General: Skin is warm and dry.   Neurological:      Mental Status: He is alert.   Psychiatric:         Mood and Affect: Mood normal.         Behavior: Behavior normal.     Assessment/Plan     Atopic dermatitis  We discussed that he can try to spread out his Dupixent to every 3 weeks to see if his dermatitis returns if he does start to have increased itching and/or rash he should of course go back to every 2 weeks.     By signing my name below, I, Viraj Garces, attest that this documentation has been prepared under the direction and in the presence of Megan Neal MD.  All medical record entries made by the [place] were at my direction and personally dictated by me. I have reviewed the " chart and agree that the record accurately reflects my personal performance of the history, physical exam, discussion and plan.

## 2024-08-20 ENCOUNTER — APPOINTMENT (OUTPATIENT)
Dept: ALLERGY | Facility: CLINIC | Age: 79
End: 2024-08-20
Payer: MEDICARE

## 2024-08-20 VITALS
BODY MASS INDEX: 24.23 KG/M2 | HEART RATE: 86 BPM | HEIGHT: 74 IN | DIASTOLIC BLOOD PRESSURE: 67 MMHG | SYSTOLIC BLOOD PRESSURE: 113 MMHG

## 2024-08-20 DIAGNOSIS — L20.89 OTHER ATOPIC DERMATITIS: Primary | ICD-10-CM

## 2024-08-26 NOTE — ASSESSMENT & PLAN NOTE
We discussed that he can try to spread out his Dupixent to every 3 weeks to see if his dermatitis returns if he does start to have increased itching and/or rash he should of course go back to every 2 weeks.

## 2024-09-03 DIAGNOSIS — L20.89 OTHER ATOPIC DERMATITIS: ICD-10-CM

## 2024-09-03 RX ORDER — DUPILUMAB 300 MG/2ML
300 INJECTION, SOLUTION SUBCUTANEOUS
Qty: 4 ML | Refills: 5 | Status: SHIPPED | OUTPATIENT
Start: 2024-09-03 | End: 2024-09-05 | Stop reason: SDUPTHER

## 2024-09-05 DIAGNOSIS — L20.89 OTHER ATOPIC DERMATITIS: Primary | ICD-10-CM

## 2024-09-05 RX ORDER — DUPILUMAB 300 MG/2ML
300 INJECTION, SOLUTION SUBCUTANEOUS
Qty: 12 ML | Refills: 3 | Status: SHIPPED | OUTPATIENT
Start: 2024-09-05

## 2024-09-18 LAB
Lab: SLIGHT
NON-UH HIE ANION GAP: 13.4 (ref 6–15)
NON-UH HIE ANISOCYTOSIS: ABNORMAL
NON-UH HIE BASOPHILS # (AUTO): 0.1 10*3/UL (ref 0–0.2)
NON-UH HIE BASOPHILS % (AUTO): 2 %
NON-UH HIE BLOOD UREA NITROGEN: 19 MG/DL (ref 7–25)
NON-UH HIE CALCIUM: 9 MG/DL (ref 8.6–10.3)
NON-UH HIE CARBON DIOXIDE: 25 MMOL/L (ref 21–31)
NON-UH HIE CHLORIDE: 99 MMOL/L (ref 98–107)
NON-UH HIE CREATININE: 1.45 MG/DL (ref 0.7–1.3)
NON-UH HIE EOSINOPHILS # (AUTO): 0.3 10*3/UL (ref 0–0.45)
NON-UH HIE EOSINOPHILS % (AUTO): 6.3 %
NON-UH HIE ESTIMATED GFR: 48.41
NON-UH HIE GLUCOSE: 81 MG/DL (ref 70–100)
NON-UH HIE HEMATOCRIT: 39.6 % (ref 38.8–50)
NON-UH HIE HEMOGLOBIN: 13.4 G/DL (ref 13–17)
NON-UH HIE INR: 1.3
NON-UH HIE LYMPHOCYTES # (AUTO): 1.1 10*3/UL (ref 1–4.8)
NON-UH HIE LYMPHOCYTES % (AUTO): 21.5 %
NON-UH HIE MACROCYTOSIS: ABNORMAL
NON-UH HIE MEAN CORPUSCULAR HEMOGLOBIN: 38.6 PG (ref 27.5–35.2)
NON-UH HIE MEAN CORPUSCULAR HGB CONC: 33.7 G/DL (ref 32.5–35.6)
NON-UH HIE MEAN CORPUSCULAR VOLUME: 114.5 FL (ref 83.5–101)
NON-UH HIE MEAN PLATELET VOLUME: 7.3 FL (ref 6.6–10.1)
NON-UH HIE MONOCYTES # (AUTO): 0.7 10*3/UL (ref 0–0.8)
NON-UH HIE MONOCYTES % (AUTO): 14 %
NON-UH HIE NEUTROPHILS # (AUTO): 2.8 10*3/UL (ref 1.8–7.7)
NON-UH HIE NEUTROPHILS % (AUTO): 56.2 %
NON-UH HIE NRBC%: 0.2 /100{WBC} (ref 0–0.5)
NON-UH HIE OVALOCYTES: SLIGHT
NON-UH HIE PLATELET COUNT: 323 10*3/UL (ref 150–450)
NON-UH HIE PLATELET ESTIMATE: NORMAL
NON-UH HIE PLATELET MORPHOLOGY: NORMAL
NON-UH HIE POIKILOCYTOSIS: SLIGHT
NON-UH HIE POLYCHROMASIA: SLIGHT
NON-UH HIE POTASSIUM: 4.4 MMOL/L (ref 3.5–5.1)
NON-UH HIE PROTHROMBIN TIME: 15.3 S (ref 9–12.9)
NON-UH HIE RED BLOOD COUNT: 3.46 (ref 3.9–5.6)
NON-UH HIE RED CELL DISTRIBUTION WIDTH: 14.7 % (ref 12–14.8)
NON-UH HIE SODIUM: 133 MMOL/L (ref 136–145)
NON-UH HIE UNCORRECTED WBC: 5 10*3/UL (ref 4.1–10.5)
NON-UH HIE WHITE BLOOD COUNT: 5 10*3/UL (ref 4.1–10.5)

## 2024-09-24 RX ORDER — ENOXAPARIN SODIUM 100 MG/ML
80 INJECTION SUBCUTANEOUS EVERY 12 HOURS
COMMUNITY
End: 2024-09-25 | Stop reason: HOSPADM

## 2024-09-24 RX ORDER — MUPIROCIN 20 MG/G
1 OINTMENT TOPICAL ONCE
Status: CANCELLED | OUTPATIENT
Start: 2024-09-24 | End: 2024-09-24

## 2024-09-24 RX ORDER — CEFAZOLIN SODIUM 1 G/50ML
1 SOLUTION INTRAVENOUS ONCE
Status: CANCELLED | OUTPATIENT
Start: 2024-09-24 | End: 2024-09-24

## 2024-09-25 ENCOUNTER — APPOINTMENT (OUTPATIENT)
Dept: CARDIOLOGY | Facility: HOSPITAL | Age: 79
End: 2024-09-25
Payer: MEDICARE

## 2024-09-25 ENCOUNTER — ANESTHESIA (OUTPATIENT)
Dept: CARDIOLOGY | Facility: HOSPITAL | Age: 79
End: 2024-09-25
Payer: MEDICARE

## 2024-09-25 ENCOUNTER — HOSPITAL ENCOUNTER (OUTPATIENT)
Facility: HOSPITAL | Age: 79
Setting detail: OUTPATIENT SURGERY
Discharge: HOME | End: 2024-09-25
Attending: INTERNAL MEDICINE | Admitting: INTERNAL MEDICINE
Payer: MEDICARE

## 2024-09-25 ENCOUNTER — ANESTHESIA EVENT (OUTPATIENT)
Dept: CARDIOLOGY | Facility: HOSPITAL | Age: 79
End: 2024-09-25
Payer: MEDICARE

## 2024-09-25 DIAGNOSIS — R06.02 SHORTNESS OF BREATH: ICD-10-CM

## 2024-09-25 DIAGNOSIS — I44.2 CHB (COMPLETE HEART BLOCK): ICD-10-CM

## 2024-09-25 DIAGNOSIS — I50.22 CHRONIC SYSTOLIC CONGESTIVE HEART FAILURE, NYHA CLASS 2: ICD-10-CM

## 2024-09-25 DIAGNOSIS — I51.3 LV (LEFT VENTRICULAR) MURAL THROMBUS: ICD-10-CM

## 2024-09-25 DIAGNOSIS — I48.0 PAROXYSMAL ATRIAL FIBRILLATION (MULTI): Primary | ICD-10-CM

## 2024-09-25 DIAGNOSIS — I25.5 ISCHEMIC CARDIOMYOPATHY: ICD-10-CM

## 2024-09-25 DIAGNOSIS — G89.18 POSTOPERATIVE PAIN: ICD-10-CM

## 2024-09-25 DIAGNOSIS — I25.10 ARTERIOSCLEROSIS OF CORONARY ARTERY: ICD-10-CM

## 2024-09-25 DIAGNOSIS — R53.83 OTHER FATIGUE: ICD-10-CM

## 2024-09-25 DIAGNOSIS — Z95.810 ICD (IMPLANTABLE CARDIOVERTER-DEFIBRILLATOR) IN PLACE: ICD-10-CM

## 2024-09-25 LAB
ANION GAP SERPL CALC-SCNC: 16 MMOL/L (ref 10–20)
BUN SERPL-MCNC: 21 MG/DL (ref 6–23)
CALCIUM SERPL-MCNC: 9.1 MG/DL (ref 8.6–10.3)
CHLORIDE SERPL-SCNC: 99 MMOL/L (ref 98–107)
CO2 SERPL-SCNC: 20 MMOL/L (ref 21–32)
CREAT SERPL-MCNC: 1.2 MG/DL (ref 0.5–1.3)
EGFRCR SERPLBLD CKD-EPI 2021: 61 ML/MIN/1.73M*2
GLUCOSE SERPL-MCNC: 85 MG/DL (ref 74–99)
POTASSIUM SERPL-SCNC: 4.2 MMOL/L (ref 3.5–5.3)
SODIUM SERPL-SCNC: 131 MMOL/L (ref 136–145)

## 2024-09-25 PROCEDURE — 33225 L VENTRIC PACING LEAD ADD-ON: CPT | Performed by: INTERNAL MEDICINE

## 2024-09-25 PROCEDURE — 93284 PRGRMG EVAL IMPLANTABLE DFB: CPT | Performed by: INTERNAL MEDICINE

## 2024-09-25 PROCEDURE — 7100000009 HC PHASE TWO TIME - INITIAL BASE CHARGE: Performed by: INTERNAL MEDICINE

## 2024-09-25 PROCEDURE — 2500000005 HC RX 250 GENERAL PHARMACY W/O HCPCS: Performed by: INTERNAL MEDICINE

## 2024-09-25 PROCEDURE — 93287 PERI-PX DEVICE EVAL & PRGR: CPT | Performed by: INTERNAL MEDICINE

## 2024-09-25 PROCEDURE — 93005 ELECTROCARDIOGRAM TRACING: CPT

## 2024-09-25 PROCEDURE — 93281 PM DEVICE PROGR EVAL MULTI: CPT | Mod: 59 | Performed by: INTERNAL MEDICINE

## 2024-09-25 PROCEDURE — 2750000001 HC OR 275 NO HCPCS: Performed by: INTERNAL MEDICINE

## 2024-09-25 PROCEDURE — 2500000001 HC RX 250 WO HCPCS SELF ADMINISTERED DRUGS (ALT 637 FOR MEDICARE OP): Performed by: INTERNAL MEDICINE

## 2024-09-25 PROCEDURE — 93010 ELECTROCARDIOGRAM REPORT: CPT | Performed by: INTERNAL MEDICINE

## 2024-09-25 PROCEDURE — 33223 RELOCATE POCKET FOR DEFIB: CPT | Mod: 59 | Performed by: INTERNAL MEDICINE

## 2024-09-25 PROCEDURE — 7100000002 HC RECOVERY ROOM TIME - EACH INCREMENTAL 1 MINUTE: Performed by: INTERNAL MEDICINE

## 2024-09-25 PROCEDURE — 2500000004 HC RX 250 GENERAL PHARMACY W/ HCPCS (ALT 636 FOR OP/ED): Performed by: NURSE ANESTHETIST, CERTIFIED REGISTERED

## 2024-09-25 PROCEDURE — 2500000004 HC RX 250 GENERAL PHARMACY W/ HCPCS (ALT 636 FOR OP/ED): Performed by: INTERNAL MEDICINE

## 2024-09-25 PROCEDURE — 2720000007 HC OR 272 NO HCPCS: Performed by: INTERNAL MEDICINE

## 2024-09-25 PROCEDURE — 93640 EP EVAL 1/2CHMBR PACG CVDFB: CPT | Performed by: INTERNAL MEDICINE

## 2024-09-25 PROCEDURE — 80048 BASIC METABOLIC PNL TOTAL CA: CPT | Performed by: NURSE PRACTITIONER

## 2024-09-25 PROCEDURE — C1892 INTRO/SHEATH,FIXED,PEEL-AWAY: HCPCS | Performed by: INTERNAL MEDICINE

## 2024-09-25 PROCEDURE — C1769 GUIDE WIRE: HCPCS | Performed by: INTERNAL MEDICINE

## 2024-09-25 PROCEDURE — 33264 RMVL & RPLCMT DFB GEN MLT LD: CPT | Performed by: INTERNAL MEDICINE

## 2024-09-25 PROCEDURE — 93284 PRGRMG EVAL IMPLANTABLE DFB: CPT

## 2024-09-25 PROCEDURE — C1781 MESH (IMPLANTABLE): HCPCS | Performed by: INTERNAL MEDICINE

## 2024-09-25 PROCEDURE — 7100000001 HC RECOVERY ROOM TIME - INITIAL BASE CHARGE: Performed by: INTERNAL MEDICINE

## 2024-09-25 PROCEDURE — 7100000010 HC PHASE TWO TIME - EACH INCREMENTAL 1 MINUTE: Performed by: INTERNAL MEDICINE

## 2024-09-25 PROCEDURE — 3700000001 HC GENERAL ANESTHESIA TIME - INITIAL BASE CHARGE: Performed by: INTERNAL MEDICINE

## 2024-09-25 PROCEDURE — 75860 VEIN X-RAY NECK: CPT | Mod: CCI | Performed by: INTERNAL MEDICINE

## 2024-09-25 PROCEDURE — 33225 L VENTRIC PACING LEAD ADD-ON: CPT | Mod: 22 | Performed by: INTERNAL MEDICINE

## 2024-09-25 PROCEDURE — C1882 AICD, OTHER THAN SING/DUAL: HCPCS | Performed by: INTERNAL MEDICINE

## 2024-09-25 PROCEDURE — C1730 CATH, EP, 19 OR FEW ELECT: HCPCS | Performed by: INTERNAL MEDICINE

## 2024-09-25 PROCEDURE — 2780000003 HC OR 278 NO HCPCS: Performed by: INTERNAL MEDICINE

## 2024-09-25 PROCEDURE — C1900 LEAD, CORONARY VENOUS: HCPCS | Performed by: INTERNAL MEDICINE

## 2024-09-25 PROCEDURE — 99223 1ST HOSP IP/OBS HIGH 75: CPT | Performed by: INTERNAL MEDICINE

## 2024-09-25 PROCEDURE — C1894 INTRO/SHEATH, NON-LASER: HCPCS | Performed by: INTERNAL MEDICINE

## 2024-09-25 PROCEDURE — 3700000002 HC GENERAL ANESTHESIA TIME - EACH INCREMENTAL 1 MINUTE: Performed by: INTERNAL MEDICINE

## 2024-09-25 PROCEDURE — 36415 COLL VENOUS BLD VENIPUNCTURE: CPT | Performed by: NURSE PRACTITIONER

## 2024-09-25 PROCEDURE — 2500000004 HC RX 250 GENERAL PHARMACY W/ HCPCS (ALT 636 FOR OP/ED): Performed by: NURSE PRACTITIONER

## 2024-09-25 DEVICE — LEAD 459888 MRI S-TIP US
Type: IMPLANTABLE DEVICE | Site: HEART | Status: FUNCTIONAL
Brand: ATTAIN PERFORMA™ S MRI SURESCAN™

## 2024-09-25 DEVICE — SLITTER, ADJUSTABLE: Type: IMPLANTABLE DEVICE | Site: CHEST | Status: FUNCTIONAL

## 2024-09-25 DEVICE — CRTD DTMA1Q1 CLARIA MRI QUAD US DF1
Type: IMPLANTABLE DEVICE | Site: CHEST | Status: FUNCTIONAL
Brand: CLARIA MRI™ QUAD CRT-D SURESCAN™

## 2024-09-25 RX ORDER — ASPIRIN 81 MG/1
81 TABLET ORAL EVERY OTHER DAY
Start: 2024-09-25

## 2024-09-25 RX ORDER — CYANOCOBALAMIN 1000 UG/ML
1000 INJECTION, SOLUTION INTRAMUSCULAR; SUBCUTANEOUS
COMMUNITY

## 2024-09-25 RX ORDER — BUPIVACAINE HYDROCHLORIDE 2.5 MG/ML
INJECTION, SOLUTION EPIDURAL; INFILTRATION; INTRACAUDAL AS NEEDED
Status: DISCONTINUED | OUTPATIENT
Start: 2024-09-25 | End: 2024-09-25 | Stop reason: HOSPADM

## 2024-09-25 RX ORDER — CALCIUM CARBONATE/VITAMIN D3 500-10/5ML
400 LIQUID (ML) ORAL
COMMUNITY
Start: 2023-12-12

## 2024-09-25 RX ORDER — MUPIROCIN 20 MG/G
1 OINTMENT TOPICAL ONCE
Status: COMPLETED | OUTPATIENT
Start: 2024-09-25 | End: 2024-09-25

## 2024-09-25 RX ORDER — ACETAMINOPHEN 325 MG/1
650 TABLET ORAL EVERY 4 HOURS PRN
Start: 2024-09-25

## 2024-09-25 RX ORDER — TRAMADOL HYDROCHLORIDE 50 MG/1
50 TABLET ORAL EVERY 8 HOURS PRN
Qty: 10 TABLET | Refills: 0 | Status: SHIPPED | OUTPATIENT
Start: 2024-09-25

## 2024-09-25 RX ORDER — LANOLIN ALCOHOL/MO/W.PET/CERES
1 CREAM (GRAM) TOPICAL DAILY
COMMUNITY
End: 2024-12-11 | Stop reason: WASHOUT

## 2024-09-25 RX ORDER — ACETAMINOPHEN 325 MG/1
650 TABLET ORAL EVERY 4 HOURS PRN
Status: DISCONTINUED | OUTPATIENT
Start: 2024-09-25 | End: 2024-09-25 | Stop reason: HOSPADM

## 2024-09-25 RX ORDER — SODIUM CHLORIDE 9 MG/ML
10 INJECTION, SOLUTION INTRAVENOUS CONTINUOUS
Status: DISCONTINUED | OUTPATIENT
Start: 2024-09-25 | End: 2024-09-25

## 2024-09-25 RX ORDER — ONDANSETRON HYDROCHLORIDE 2 MG/ML
4 INJECTION, SOLUTION INTRAVENOUS EVERY 8 HOURS PRN
Status: DISCONTINUED | OUTPATIENT
Start: 2024-09-25 | End: 2024-09-25 | Stop reason: HOSPADM

## 2024-09-25 RX ORDER — RIVAROXABAN 20 MG/1
20 TABLET, FILM COATED ORAL
Start: 2024-09-25

## 2024-09-25 RX ORDER — TRAMADOL HYDROCHLORIDE 50 MG/1
50 TABLET ORAL EVERY 6 HOURS PRN
Status: DISCONTINUED | OUTPATIENT
Start: 2024-09-25 | End: 2024-09-25 | Stop reason: HOSPADM

## 2024-09-25 RX ORDER — CEFAZOLIN SODIUM 2 G/50ML
2 SOLUTION INTRAVENOUS ONCE
Status: COMPLETED | OUTPATIENT
Start: 2024-09-25 | End: 2024-09-25

## 2024-09-25 RX ORDER — METOPROLOL SUCCINATE 25 MG/1
75 TABLET, EXTENDED RELEASE ORAL EVERY EVENING
Start: 2024-09-25

## 2024-09-25 RX ORDER — PROPOFOL 10 MG/ML
INJECTION, EMULSION INTRAVENOUS AS NEEDED
Status: DISCONTINUED | OUTPATIENT
Start: 2024-09-25 | End: 2024-09-25

## 2024-09-25 RX ORDER — PSYLLIUM HUSK 0.4 G
1 CAPSULE ORAL 2 TIMES DAILY
COMMUNITY

## 2024-09-25 RX ORDER — LIDOCAINE HYDROCHLORIDE 10 MG/ML
INJECTION, SOLUTION EPIDURAL; INFILTRATION; INTRACAUDAL; PERINEURAL AS NEEDED
Status: DISCONTINUED | OUTPATIENT
Start: 2024-09-25 | End: 2024-09-25 | Stop reason: HOSPADM

## 2024-09-25 RX ORDER — PHENYLEPHRINE HCL IN 0.9% NACL 1 MG/10 ML
SYRINGE (ML) INTRAVENOUS AS NEEDED
Status: DISCONTINUED | OUTPATIENT
Start: 2024-09-25 | End: 2024-09-25

## 2024-09-25 RX ADMIN — ACETAMINOPHEN 650 MG: 325 TABLET ORAL at 13:41

## 2024-09-25 RX ADMIN — SODIUM CHLORIDE 10 ML/HR: 9 INJECTION, SOLUTION INTRAVENOUS at 08:39

## 2024-09-25 RX ADMIN — MUPIROCIN 1 APPLICATION: 20 OINTMENT TOPICAL at 08:37

## 2024-09-25 RX ADMIN — SODIUM CHLORIDE 10 ML/HR: 9 INJECTION, SOLUTION INTRAVENOUS at 08:38

## 2024-09-25 SDOH — HEALTH STABILITY: MENTAL HEALTH: CURRENT SMOKER: 0

## 2024-09-25 ASSESSMENT — PAIN SCALES - GENERAL
PAINLEVEL_OUTOF10: 3
PAIN_LEVEL: 0
PAINLEVEL_OUTOF10: 1

## 2024-09-25 ASSESSMENT — ENCOUNTER SYMPTOMS
PALPITATIONS: 0
FATIGUE: 1
SHORTNESS OF BREATH: 1

## 2024-09-25 ASSESSMENT — COLUMBIA-SUICIDE SEVERITY RATING SCALE - C-SSRS
2. HAVE YOU ACTUALLY HAD ANY THOUGHTS OF KILLING YOURSELF?: NO
6. HAVE YOU EVER DONE ANYTHING, STARTED TO DO ANYTHING, OR PREPARED TO DO ANYTHING TO END YOUR LIFE?: NO
1. IN THE PAST MONTH, HAVE YOU WISHED YOU WERE DEAD OR WISHED YOU COULD GO TO SLEEP AND NOT WAKE UP?: NO

## 2024-09-25 ASSESSMENT — PAIN DESCRIPTION - LOCATION: LOCATION: BACK

## 2024-09-25 NOTE — H&P
History Of Present Illness  Arnav Ramirez is a 79 y.o. male presenting with treatment for heart failure.  He is here for upgrade of his device.    Patient denies any arrhythmia symptoms of palpitation, lightheadedness, near syncope, or syncope.    He has chronic BERNARD and fatigue.     Past Medical History  Past Medical History:   Diagnosis Date    Arrhythmia     CHF (congestive heart failure) (Multi)     Chronic kidney disease     Coronary artery disease     Hyperlipidemia     Hypertension     Hypothyroid     Intracardiac thrombosis, not elsewhere classified 08/05/2022    LV (left ventricular) mural thrombus    Malignant neoplasm of prostate (Multi) 02/12/2014    Prostate CA    Other cardiomyopathies (Multi) 02/12/2014    Cardiomyopathy, nonischemic    Other conditions influencing health status 03/07/2022    Patient new to provider    Other long term (current) drug therapy 09/21/2022    High risk medication use    Other specified peripheral vascular diseases (CMS-HCC) 07/20/2022    Acrocyanosis    Personal history of other diseases of the circulatory system 09/21/2022    History of hypertension    Personal history of other diseases of the circulatory system 06/30/2022    History of atrial tachycardia    Personal history of other diseases of the circulatory system 02/12/2014    Personal history of coronary atherosclerosis    Personal history of other specified conditions 02/12/2014    History of palpitations       Surgical History  Past Surgical History:   Procedure Laterality Date    OTHER SURGICAL HISTORY  02/12/2014    Implantable Cardioverter-Defibrillator    OTHER SURGICAL HISTORY  04/08/2022    Cardioversion    OTHER SURGICAL HISTORY  03/07/2022    Atrial appendage closure device insertion    OTHER SURGICAL HISTORY  11/19/2021    Cardiac catheterization    OTHER SURGICAL HISTORY  11/19/2021    Complete colonoscopy    OTHER SURGICAL HISTORY  11/19/2021    Cholecystectomy    OTHER SURGICAL HISTORY  11/19/2021     Percutaneous transluminal coronary angioplasty        Social History  He reports that he has never smoked. He has never used smokeless tobacco. He reports that he does not currently use alcohol. He reports that he does not currently use drugs.    Family History  Family History   Problem Relation Name Age of Onset    Colon cancer Mother      Other (cardiac disorder) Father      Heart attack Father      Coronary artery disease Brother      Heart attack Brother          Allergies  Chloraprep clear [chlorhexidin-isopropyl alcohol], Chlorhexidine, and Spironolactone    Review of Systems   Constitutional:  Positive for fatigue.   Respiratory:  Positive for shortness of breath.    Cardiovascular:  Positive for chest pain. Negative for palpitations and leg swelling.   All other systems reviewed and are negative.       Physical Exam  Vitals reviewed.   HENT:      Head: Normocephalic.      Nose: Nose normal.      Mouth/Throat:      Mouth: Mucous membranes are dry.   Eyes:      Extraocular Movements: Extraocular movements intact.   Cardiovascular:      Rate and Rhythm: Regular rhythm.      Heart sounds: Normal heart sounds.   Pulmonary:      Breath sounds: No wheezing.   Abdominal:      Palpations: Abdomen is soft.   Musculoskeletal:         General: Normal range of motion.      Cervical back: Neck supple.   Skin:     General: Skin is dry.   Neurological:      Mental Status: He is alert and oriented to person, place, and time.   Psychiatric:         Mood and Affect: Mood normal.         Behavior: Behavior normal.                Relevant Results  Lab Results   Component Value Date    WBC 7.2 09/20/2023    HGB 10.9 (L) 09/20/2023    HCT 34.6 (L) 09/20/2023    MCV 93 09/20/2023     (H) 09/20/2023      Lab Results   Component Value Date    GLUCOSE 85 09/25/2024    CALCIUM 9.1 09/25/2024     (L) 09/25/2024    K 4.2 09/25/2024    CO2 20 (L) 09/25/2024    CL 99 09/25/2024    BUN 21 09/25/2024    CREATININE 1.20  09/25/2024      Lab Results   Component Value Date    INR 1.0 03/21/2022    INR 1.3 (H) 01/11/2022    PROTIME 11.3 03/21/2022    PROTIME 15.6 (H) 01/11/2022           Assessment/Plan   Assessment & Plan  Other fatigue    CHB (complete heart block)    Chronic systolic congestive heart failure, NYHA class 2    Dyspnea    Ischemic cardiomyopathy    Left bundle branch block     Counseling greater than 50% of the visit performed.  Patient and I discussed congestive heart failure, current device, upgrade of device, variations of anatomy, evaluation of anatomy and stability of lead and if lead can be placed, treatment options, risk, benefits, and imponderables.  All questions answered.  E consent confirmed.  Patient appreciative care      I spent  85 minutes in the professional and overall care of this patient.      Awa Gasca MD

## 2024-09-25 NOTE — Clinical Note
Patient Clipped and Prepped: chest. Prepped with Betadine and draped in sterile fashion and Duraprep.

## 2024-09-25 NOTE — NURSING NOTE
Discharge instructions reviewed with patient and family. Discussed in depth post procedure restrictions, changes to medications and follow up appointments. Questions and concerns addressed. Plan to dc home.

## 2024-09-25 NOTE — Clinical Note
The DEFIBRILLATOR, QUAD CRT-D, CLARIA MRI, SURESCAN, DF1 - [id number] device was inserted. The leads were placed into the connector and visually verified to be in correct position.

## 2024-09-25 NOTE — NURSING NOTE
Pt arrived from [facility] via cart and [facility] staff. Pt A&O with L arm Immobilizer maintained with pillow under L arm for support. L upper chest site soft, mepilex drsg D&I with 2+ R radial pulse noted. Telemetry applied, ice pack applied to L upper chest incision site. Call light placed in easy reach.

## 2024-09-25 NOTE — ANESTHESIA PREPROCEDURE EVALUATION
Arnav Ramirez is a 79 y.o. male here for:    ICD UPGRADE, DUAL TO BIV  With wAa Gasca MD  Pre-Op Diagnosis Codes:      * Complete heart block [I44.2]     * Heart failure [I50.22]     * Heart muscle disorder [I25.5]     * Other fatigue [R53.83]     * Shortness of breath [R06.02]    Relevant Problems   Cardiac   (+) Arteriosclerosis of coronary artery ( s/p remote stents x 2, 1998)   (+) Atrial fibrillation (Multi)   (+) Atrial flutter (Multi)   (+) CHB (complete heart block)   (+) Essential hypertension   (+) Hyperlipidemia   (+) ICD (implantable cardioverter-defibrillator) in place   (+) LBBB (left bundle branch block)   (+) Myocardial infarction (Multi)   (+) Sinus node dysfunction (Multi)      Hematology   (+) Anemia      Nervous   (+) Parkinson disease (Multi)      Circulatory   (+) Chronic systolic congestive heart failure, NYHA class 2   (+) Ischemic cardiomyopathy   (+) Presence of Watchman left atrial appendage closure device      Genitourinary   (+) Stage 3b chronic kidney disease (Multi)       Lab Results   Component Value Date    HGB 10.9 (L) 09/20/2023    HCT 34.6 (L) 09/20/2023    WBC 7.2 09/20/2023     (H) 09/20/2023     09/20/2023    K 4.0 09/20/2023     09/20/2023    CREATININE 1.58 (H) 09/20/2023    BUN 23 09/20/2023     TTE 2023:  CONCLUSIONS:    1. Left ventricular systolic function is severely decreased with a 20% estimated ejection fraction.    2. A pacemaker wire is seen in the right ventricle.    3. Large left-sided pleural effusion measuring 10 cm in thickness with dense exudates within the pleural cavity, compared to study from June 2023 the left-sided pleural effusion continues to be present but the apical thrombus is no longer seen.    4. There is global hypokinesis of the left ventricle with minor regional variations.         Social History     Tobacco Use   Smoking Status Never   Smokeless Tobacco Never       Allergies   Allergen Reactions    Chloraprep Clear  [Chlorhexidin-Isopropyl Alcohol] Unknown    Chlorhexidine Unknown    Spironolactone Unknown       Current Outpatient Medications   Medication Instructions    aspirin 81 mg, oral, Every other day    busPIRone (BUSPAR) 15 mg, oral, 3 times daily    carbidopa-levodopa (Sinemet CR)  mg ER tablet 1 tablet, oral, 4 times daily    citalopram (CELEXA) 30 mg, oral, Daily    donepezil (Aricept) 5 mg tablet 1 tablet, oral, 2 times daily    Dupixent Syringe 300 mg, subcutaneous, Every 14 days    enoxaparin (LOVENOX) 80 mg, subcutaneous, Every 12 hours, Start 2 days prior to procedure-AM and PM. 1 day prior-AM only.    eplerenone (Inspra) 25 mg tablet 1 tablet, oral, Daily    furosemide (LASIX) 20 mg, oral, One tablet every other day    ipratropium (Atrovent) 42 mcg (0.06 %) nasal spray 2 sprays, Each Nostril, 3 times daily    Jardiance 10 mg, oral, Daily RT    latanoprost (Xalatan) 0.005 % ophthalmic solution 1 drop, Both Eyes, Nightly    levothyroxine (Synthroid, Levoxyl) 100 mcg tablet 1 tablet, oral, Daily    metoprolol succinate XL (TOPROL-XL) 25 mg, oral, Daily    mirtazapine (Remeron) 15 mg tablet     nitroglycerin (NITROSTAT) 0.4 mg, sublingual, Every 5 min PRN    omeprazole (PriLOSEC) 40 mg DR capsule 1 capsule, oral, Daily    rosuvastatin (CRESTOR) 10 mg, oral, Daily    sacubitriL-valsartan (Entresto) 24-26 mg tablet Take 1 tablet by mouth 2 times a day.    traMADol (ULTRAM) 50 mg, oral, Every 6 hours PRN    Xarelto 20 mg, oral, Daily with evening meal       Past Surgical History:   Procedure Laterality Date    OTHER SURGICAL HISTORY  02/12/2014    Implantable Cardioverter-Defibrillator    OTHER SURGICAL HISTORY  04/08/2022    Cardioversion    OTHER SURGICAL HISTORY  03/07/2022    Atrial appendage closure device insertion    OTHER SURGICAL HISTORY  11/19/2021    Cardiac catheterization    OTHER SURGICAL HISTORY  11/19/2021    Complete colonoscopy    OTHER SURGICAL HISTORY  11/19/2021    Cholecystectomy    OTHER  SURGICAL HISTORY  11/19/2021    Percutaneous transluminal coronary angioplasty       Family History   Problem Relation Name Age of Onset    Colon cancer Mother      Other (cardiac disorder) Father      Heart attack Father      Coronary artery disease Brother      Heart attack Brother         NPO Details:  No data recorded    Physical Exam    Airway  Mallampati: II  TM distance: >3 FB     Cardiovascular    Dental    Pulmonary    Abdominal            Anesthesia Plan    History of general anesthesia?: yes  History of complications of general anesthesia?: no    ASA 4     MAC     The patient is not a current smoker.    intravenous induction   Anesthetic plan and risks discussed with patient.    Plan discussed with CRNA.

## 2024-09-25 NOTE — ANESTHESIA POSTPROCEDURE EVALUATION
Patient: Arnav Ramirez    Procedure Summary       Date: 09/25/24 Room / Location: [facility] / [facility]    Anesthesia Start: 1002 Anesthesia Stop: 1155    Procedure: ICD UPGRADE, DUAL TO BIV (Left) Diagnosis:       CHB (complete heart block)      Chronic systolic congestive heart failure, NYHA class 2      Ischemic cardiomyopathy      Other fatigue      Shortness of breath      (CHB (complete heart block) (Multi) [I44.2])      (Chronic systolic congestive heart failure, NYHA class 2 (Multi) [I50.22])      (Ischemic cardiomyopathy [I25.5])      (Other fatigue [R53.83])      (Shortness of breath [R06.02])    Providers: Awa Gasca MD Responsible Provider: Sarkis Choi MD    Anesthesia Type: MAC ASA Status: 4            Anesthesia Type: MAC    Vitals Value Taken Time   /56 09/25/24 1156   Temp 36.5 09/25/24 1156   Pulse 70 09/25/24 1156   Resp 18 09/25/24 1156   SpO2 100 09/25/24 1156       Anesthesia Post Evaluation    Patient location during evaluation: bedside  Patient participation: complete - patient participated  Level of consciousness: awake and alert  Pain score: 0  Pain management: adequate  Airway patency: patent  Cardiovascular status: acceptable and stable  Respiratory status: acceptable and room air  Hydration status: acceptable  Postoperative Nausea and Vomiting: none      No notable events documented.

## 2024-09-29 LAB
ATRIAL RATE: 74 BPM
ATRIAL RATE: 88 BPM
P AXIS: 118 DEGREES
P OFFSET: 218 MS
P ONSET: 144 MS
PR INTERVAL: 152 MS
Q ONSET: 195 MS
Q ONSET: 220 MS
QRS COUNT: 11 BEATS
QRS COUNT: 14 BEATS
QRS DURATION: 132 MS
QRS DURATION: 76 MS
QT INTERVAL: 392 MS
QT INTERVAL: 484 MS
QTC CALCULATION(BAZETT): 474 MS
QTC CALCULATION(BAZETT): 522 MS
QTC FREDERICIA: 445 MS
QTC FREDERICIA: 509 MS
R AXIS: -83 DEGREES
R AXIS: 221 DEGREES
T AXIS: -10 DEGREES
T AXIS: 110 DEGREES
T OFFSET: 416 MS
T OFFSET: 437 MS
VENTRICULAR RATE: 70 BPM
VENTRICULAR RATE: 88 BPM

## 2024-10-02 ENCOUNTER — APPOINTMENT (OUTPATIENT)
Dept: PRIMARY CARE | Facility: CLINIC | Age: 79
End: 2024-10-02
Payer: MEDICARE

## 2024-10-02 VITALS
HEART RATE: 76 BPM | WEIGHT: 202 LBS | DIASTOLIC BLOOD PRESSURE: 67 MMHG | TEMPERATURE: 97 F | SYSTOLIC BLOOD PRESSURE: 100 MMHG | BODY MASS INDEX: 25.94 KG/M2

## 2024-10-02 DIAGNOSIS — Z95.810 ICD (IMPLANTABLE CARDIOVERTER-DEFIBRILLATOR) IN PLACE: ICD-10-CM

## 2024-10-02 NOTE — PROGRESS NOTES
Patient present for wound check. Patient had ICD upgrade with Dr Gasca on 9/25/2024 at [facility] for Complete heart block, systolic congestive heart failure, and ischemic cardiomyopathy.     Bandage was removed, steri strips intact, no ecchymosis present, no signs or symptoms of infection.

## 2024-11-04 ENCOUNTER — TELEPHONE (OUTPATIENT)
Dept: PRIMARY CARE | Facility: CLINIC | Age: 79
End: 2024-11-04
Payer: MEDICARE

## 2024-11-04 DIAGNOSIS — I50.22 CHRONIC SYSTOLIC CONGESTIVE HEART FAILURE, NYHA CLASS 2: Primary | ICD-10-CM

## 2024-11-04 DIAGNOSIS — I25.5 ISCHEMIC CARDIOMYOPATHY: ICD-10-CM

## 2024-11-04 NOTE — TELEPHONE ENCOUNTER
Pt called wanting to know if there is a med for ATTR-CM condition, he said it is consistent with low back pain, heart failure and SOB.

## 2024-11-06 ENCOUNTER — HOSPITAL ENCOUNTER (OUTPATIENT)
Dept: CARDIOLOGY | Facility: HOSPITAL | Age: 79
Discharge: HOME | End: 2024-11-06
Payer: MEDICARE

## 2024-11-06 DIAGNOSIS — I44.2 CHB (COMPLETE HEART BLOCK): ICD-10-CM

## 2024-11-06 DIAGNOSIS — Z95.810 ICD (IMPLANTABLE CARDIOVERTER-DEFIBRILLATOR) IN PLACE: ICD-10-CM

## 2024-11-06 PROCEDURE — 93296 REM INTERROG EVL PM/IDS: CPT

## 2024-11-06 NOTE — TELEPHONE ENCOUNTER
Per Dr. Gasca, we will refer to Dr. Neal for ? Amyloid. Returned call to patient and he verbalized understanding, Referral placed and sent to physician for signature. Will route to scheduling as well.

## 2024-11-27 ENCOUNTER — HOSPITAL ENCOUNTER (OUTPATIENT)
Dept: GASTROENTEROLOGY | Facility: HOSPITAL | Age: 79
Discharge: HOME | End: 2024-11-27
Payer: MEDICARE

## 2024-11-27 VITALS
OXYGEN SATURATION: 98 % | HEART RATE: 79 BPM | RESPIRATION RATE: 16 BRPM | SYSTOLIC BLOOD PRESSURE: 84 MMHG | DIASTOLIC BLOOD PRESSURE: 58 MMHG

## 2024-11-27 DIAGNOSIS — R13.10 DYSPHAGIA, UNSPECIFIED: ICD-10-CM

## 2024-11-27 PROCEDURE — 91010 ESOPHAGUS MOTILITY STUDY: CPT | Performed by: INTERNAL MEDICINE

## 2024-11-27 PROCEDURE — 91010 ESOPHAGUS MOTILITY STUDY: CPT

## 2024-11-27 ASSESSMENT — PAIN - FUNCTIONAL ASSESSMENT
PAIN_FUNCTIONAL_ASSESSMENT: 0-10
PAIN_FUNCTIONAL_ASSESSMENT: 0-10

## 2024-11-27 ASSESSMENT — PAIN SCALES - GENERAL
PAINLEVEL_OUTOF10: 0 - NO PAIN
PAINLEVEL_OUTOF10: 0 - NO PAIN

## 2024-11-27 NOTE — DISCHARGE INSTRUCTIONS
Return to normal diet, medications and activities. Today, you may experience slight nasal and throat discomfort along with minimal nosebleed. Follow up with ordering provider in about two weeks for results. Please call [phone] if you need to speak with the manometry nurse about your test.

## 2024-11-27 NOTE — NURSING NOTE
Esophageal Manometry Test    Referring Provider: Robert Bishop MD  [address] [address]    Indication: Dysphagia, unspecified type [R13.10]     Symptoms: Dysphagia    Age: 79 y.o.    [facility] [place] [place]  Procedure explained-all questions answered. Placed 2% lido viscous bilateral nares via syringe. Probe placed right nare without difficulty, placed left lateral position with HOB elevated 20 degrees per/protocol. Probe adjusted for proper positioning. Swallows performed per/protocol, probe removed intact without difficulty. Discharged patient to home in stable condition without complaint.

## 2024-12-05 ENCOUNTER — TELEPHONE (OUTPATIENT)
Dept: GASTROENTEROLOGY | Facility: CLINIC | Age: 79
End: 2024-12-05
Payer: MEDICARE

## 2024-12-05 NOTE — TELEPHONE ENCOUNTER
Call to patient. СЕРГЕЙ. Relayed to patient that I need to schedule him, from a referral received from [facility], Robert Bishop. for a virtual visit with Dr Machado since he lives out in [place], [place]. Advised him to call office to schedule.

## 2024-12-11 ENCOUNTER — APPOINTMENT (OUTPATIENT)
Dept: GASTROENTEROLOGY | Facility: CLINIC | Age: 79
End: 2024-12-11
Payer: MEDICARE

## 2024-12-11 DIAGNOSIS — R13.12 OROPHARYNGEAL DYSPHAGIA: Primary | ICD-10-CM

## 2024-12-11 ASSESSMENT — ENCOUNTER SYMPTOMS
ARTHRALGIAS: 0
ABDOMINAL DISTENTION: 0
COUGH: 0
CHILLS: 0
LIGHT-HEADEDNESS: 0
CONSTIPATION: 0
UNEXPECTED WEIGHT CHANGE: 0
SLEEP DISTURBANCE: 0
VOMITING: 0
ABDOMINAL PAIN: 0
FEVER: 0
NAUSEA: 0
COLOR CHANGE: 0
WHEEZING: 0
SPEECH DIFFICULTY: 0
DIZZINESS: 0
DIARRHEA: 0
CONFUSION: 0
DIFFICULTY URINATING: 0
JOINT SWELLING: 0
HEADACHES: 0
SHORTNESS OF BREATH: 0
TROUBLE SWALLOWING: 0

## 2024-12-11 NOTE — LETTER
December 11, 2024     Robert Bishop MD  [address] [address]    Patient: Arnav Ramirez   YOB: 1945   Date of Visit: 12/11/2024       Dear Dr. Robetr Bishop MD:    Thank you for referring Arnav Ramirez to me for evaluation. Below are my notes for this consultation.  If you have questions, please do not hesitate to call me. I look forward to following your patient along with you.       Sincerely,     Balaji Machado MD      CC: No Recipients  ______________________________________________________________________________________    Subjective  Virtual or Telephone Consent    A telephone visit (audio only) between the patient (at the originating site) and the provider (at the distant site) was utilized to provide this telehealth service.   Verbal consent was requested and obtained from Arnav Ramirez on this date, 12/11/24 for a telehealth visit.       History of Present Illness:   Arnav Ramirez is a 79 y.o. male who presents to GI clinic for dysphagia  .    Dysphagia, unspecifiedPatient c/c of dysphagia occuring in throat/upper esophagus. This occurs daily with every PO intake of solid foods. This results in coughing up the food bolus. He says the food gagas him when he eats   No problem with liquids  This has been occuring for the past 2 months. Patient states he is very limited with what he is able to eat, resulting in a 5 lb weight loss. Denies chest pain or dysphagia.     Had EGD in 09/22 showed esophageal spasm, no biopsies done kyleigh     Review of Systems  Review of Systems   Constitutional:  Negative for chills, fever and unexpected weight change.   HENT:  Negative for congestion and trouble swallowing.    Respiratory:  Negative for cough, shortness of breath and wheezing.    Cardiovascular:  Negative for chest pain.   Gastrointestinal:  Negative for abdominal distention, abdominal pain, constipation, diarrhea, nausea and vomiting.   Genitourinary:  Negative for difficulty  urinating.   Musculoskeletal:  Negative for arthralgias and joint swelling.   Skin:  Negative for color change.   Neurological:  Negative for dizziness, speech difficulty, light-headedness and headaches.   Psychiatric/Behavioral:  Negative for confusion and sleep disturbance.        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     Social History   reports that he has never smoked. He has never used smokeless tobacco. He reports current alcohol use of about 5.0 standard drinks of alcohol per week. He reports that he does not currently use drugs.     Family History  family history includes Colon cancer in his mother; Coronary artery disease in his brother; Heart attack in his brother and father; cardiac disorder in his father.     Allergies  Allergies   Allergen Reactions   • Chloraprep Clear [Chlorhexidin-Isopropyl Alcohol] Unknown   • Chlorhexidine Unknown   • Spironolactone Unknown       Medications  Current Outpatient Medications   Medication Instructions   • acetaminophen (TYLENOL) 650 mg, oral, Every 4 hours PRN   • aspirin 81 mg, oral, Every other day, Hold for 1 week and resume on 10/2/2024   • busPIRone (BUSPAR) 15 mg, 3 times daily   • carbidopa-levodopa (Sinemet  CR)  mg ER tablet 1 tablet, 4 times daily   • citalopram (CELEXA) 30 mg, Daily   • cyanocobalamin (Vitamin B-12) 1,000 mcg tablet 1 tablet, Daily   • cyanocobalamin (VITAMIN B-12) 1,000 mcg, Every 30 days   • donepezil (Aricept) 5 mg tablet 1 tablet, Nightly   • Dupixent Syringe 300 mg, subcutaneous, Every 14 days   • eplerenone (Inspra) 25 mg tablet 1 tablet, Daily   • furosemide (LASIX) 20 mg, Every other day   • ipratropium (Atrovent) 42 mcg (0.06 %) nasal spray 2 sprays, 2 times daily   • Jardiance 10 mg, Daily RT   • latanoprost (Xalatan) 0.005 % ophthalmic solution 1 drop, Nightly   • levothyroxine (Synthroid, Levoxyl) 100 mcg tablet 1 tablet, Daily   • magnesium oxide 400 mg, Daily RT   • metoprolol succinate XL (TOPROL-XL) 75 mg, oral, Every evening   • mirtazapine (Remeron) 15 mg tablet Take 1 tablet (15 mg) by mouth once daily at bedtime.   • nitroglycerin (NITROSTAT) 0.4 mg, sublingual, Every 5 min PRN   • omeprazole (PriLOSEC) 40 mg DR capsule 1 capsule, Daily   • psyllium (Metamucil) 0.4 gram capsule 1 capsule, 2 times daily   • rosuvastatin (CRESTOR) 10 mg, Every evening   • sacubitriL-valsartan (Entresto) 24-26 mg tablet Take 1 tablet by mouth 2 times a day.   • traMADol (ULTRAM) 50 mg, oral, Every 8 hours PRN, If no relief from tylenol then alternate tylenol with Tramadol   • vit C/vit E ac/selenium/ginkgo (MEMORY COMPLEX ORAL) 1 capsule, Daily   • Xarelto 20 mg, oral, Daily with evening meal, Hold for 2 days and resume on 9/28/2024       Objective  NO PE was done since was this virtual           Assessment/Plan  Arnav Ramirez is a 79 y.o. male who presents to GI clinic for dysphagia  Patient with relatively new onset dysphagia based on his history appears to be more oropharyngeal than esophageal dysphagia.  He has had an endoscopy and is also had an esophageal manometry both of which were normal.    The way he describes this is more that he is choking on food.  Will place an order for  modified barium esophagram and refer him to Dr. Ruthie Farfan from ENT .      .          Balaji Machado MD

## 2024-12-11 NOTE — PROGRESS NOTES
Subjective   Virtual or Telephone Consent    A telephone visit (audio only) between the patient (at the originating site) and the provider (at the distant site) was utilized to provide this telehealth service.   Verbal consent was requested and obtained from Arnav Ramirez on this date, 12/11/24 for a telehealth visit.       History of Present Illness:   Arnav Ramirez is a 79 y.o. male who presents to GI clinic for dysphagia  .    Dysphagia, unspecifiedPatient c/c of dysphagia occuring in throat/upper esophagus. This occurs daily with every PO intake of solid foods. This results in coughing up the food bolus. He says the food gagas him when he eats   No problem with liquids  This has been occuring for the past 2 months. Patient states he is very limited with what he is able to eat, resulting in a 5 lb weight loss. Denies chest pain or dysphagia.     Had EGD in 09/22 showed esophageal spasm, no biopsies done kyleigh     Review of Systems  Review of Systems   Constitutional:  Negative for chills, fever and unexpected weight change.   HENT:  Negative for congestion and trouble swallowing.    Respiratory:  Negative for cough, shortness of breath and wheezing.    Cardiovascular:  Negative for chest pain.   Gastrointestinal:  Negative for abdominal distention, abdominal pain, constipation, diarrhea, nausea and vomiting.   Genitourinary:  Negative for difficulty urinating.   Musculoskeletal:  Negative for arthralgias and joint swelling.   Skin:  Negative for color change.   Neurological:  Negative for dizziness, speech difficulty, light-headedness and headaches.   Psychiatric/Behavioral:  Negative for confusion and sleep disturbance.        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     Social History   reports that he has never smoked. He has never used smokeless tobacco. He reports current alcohol use of about 5.0 standard drinks of alcohol per week. He reports that he does not currently use drugs.     Family History  family history includes Colon cancer in his mother; Coronary artery disease in his brother; Heart attack in his brother and father; cardiac disorder in his father.     Allergies  Allergies   Allergen Reactions    Chloraprep Clear [Chlorhexidin-Isopropyl Alcohol] Unknown    Chlorhexidine Unknown    Spironolactone Unknown       Medications  Current Outpatient Medications   Medication Instructions    acetaminophen (TYLENOL) 650 mg, oral, Every 4 hours PRN    aspirin 81 mg, oral, Every other day, Hold for 1 week and resume on 10/2/2024    busPIRone (BUSPAR) 15 mg, 3 times daily    carbidopa-levodopa (Sinemet CR)  mg ER tablet 1 tablet, 4 times daily    citalopram (CELEXA) 30 mg, Daily    cyanocobalamin (Vitamin B-12) 1,000 mcg tablet 1 tablet, Daily    cyanocobalamin (VITAMIN B-12) 1,000 mcg, Every 30 days    donepezil (Aricept) 5 mg tablet 1 tablet, Nightly    Dupixent Syringe 300 mg, subcutaneous, Every 14 days    eplerenone (Inspra) 25 mg tablet 1 tablet, Daily    furosemide (LASIX) 20 mg, Every other day    ipratropium (Atrovent) 42 mcg (0.06 %) nasal spray 2 sprays, 2 times daily    Jardiance 10 mg, Daily RT    latanoprost (Xalatan) 0.005 % ophthalmic solution 1  drop, Nightly    levothyroxine (Synthroid, Levoxyl) 100 mcg tablet 1 tablet, Daily    magnesium oxide 400 mg, Daily RT    metoprolol succinate XL (TOPROL-XL) 75 mg, oral, Every evening    mirtazapine (Remeron) 15 mg tablet Take 1 tablet (15 mg) by mouth once daily at bedtime.    nitroglycerin (NITROSTAT) 0.4 mg, sublingual, Every 5 min PRN    omeprazole (PriLOSEC) 40 mg DR capsule 1 capsule, Daily    psyllium (Metamucil) 0.4 gram capsule 1 capsule, 2 times daily    rosuvastatin (CRESTOR) 10 mg, Every evening    sacubitriL-valsartan (Entresto) 24-26 mg tablet Take 1 tablet by mouth 2 times a day.    traMADol (ULTRAM) 50 mg, oral, Every 8 hours PRN, If no relief from tylenol then alternate tylenol with Tramadol    vit C/vit E ac/selenium/ginkgo (MEMORY COMPLEX ORAL) 1 capsule, Daily    Xarelto 20 mg, oral, Daily with evening meal, Hold for 2 days and resume on 9/28/2024       Objective   NO PE was done since was this virtual           Assessment/Plan   Arnav Ramirez is a 79 y.o. male who presents to GI clinic for dysphagia  Patient with relatively new onset dysphagia based on his history appears to be more oropharyngeal than esophageal dysphagia.  He has had an endoscopy and is also had an esophageal manometry both of which were normal.    The way he describes this is more that he is choking on food.  Will place an order for modified barium esophagram and refer him to Dr. Ruthie Farfan from ENT .      .          Balaji Machado MD

## 2024-12-19 ENCOUNTER — TELEPHONE (OUTPATIENT)
Dept: PRIMARY CARE | Facility: CLINIC | Age: 79
End: 2024-12-19
Payer: MEDICARE

## 2024-12-19 NOTE — TELEPHONE ENCOUNTER
Patient left voicemail requesting referral to [facility] for his sustain objection fraction. Would like call back once done

## 2024-12-20 NOTE — TELEPHONE ENCOUNTER
Patient returned call stating he is short of breath and weak. He is getting worse. He says he was reading an article about a combination of 4 medications that can improve his ejection fraction and he would like a referral placed to cardiology at [facility]. Will route to Dr. Nguyen's office in the absence of Dr. Gasca as patient also follows with him.

## 2025-01-06 ENCOUNTER — ANCILLARY PROCEDURE (OUTPATIENT)
Dept: CARDIOLOGY | Facility: CLINIC | Age: 80
End: 2025-01-06
Payer: MEDICARE

## 2025-01-06 ENCOUNTER — APPOINTMENT (OUTPATIENT)
Dept: CARDIOLOGY | Facility: CLINIC | Age: 80
End: 2025-01-06
Payer: MEDICARE

## 2025-01-06 VITALS — DIASTOLIC BLOOD PRESSURE: 62 MMHG | OXYGEN SATURATION: 92 % | SYSTOLIC BLOOD PRESSURE: 110 MMHG | HEART RATE: 102 BPM

## 2025-01-06 DIAGNOSIS — R53.83 FATIGUE, UNSPECIFIED TYPE: ICD-10-CM

## 2025-01-06 DIAGNOSIS — I49.5 SINUS NODE DYSFUNCTION (MULTI): ICD-10-CM

## 2025-01-06 DIAGNOSIS — Z78.9 NEVER SMOKED TOBACCO: ICD-10-CM

## 2025-01-06 DIAGNOSIS — I25.5 ISCHEMIC CARDIOMYOPATHY: ICD-10-CM

## 2025-01-06 DIAGNOSIS — Z95.810 ICD (IMPLANTABLE CARDIOVERTER-DEFIBRILLATOR) IN PLACE: ICD-10-CM

## 2025-01-06 DIAGNOSIS — I44.7 LBBB (LEFT BUNDLE BRANCH BLOCK): ICD-10-CM

## 2025-01-06 DIAGNOSIS — Z71.89 ENCOUNTER TO DISCUSS TREATMENT OPTIONS: ICD-10-CM

## 2025-01-06 DIAGNOSIS — I50.22 CHRONIC SYSTOLIC CONGESTIVE HEART FAILURE, NYHA CLASS 2: ICD-10-CM

## 2025-01-06 DIAGNOSIS — I44.2 CHB (COMPLETE HEART BLOCK): ICD-10-CM

## 2025-01-06 DIAGNOSIS — I50.22 CHF (CONGESTIVE HEART FAILURE), NYHA CLASS III, CHRONIC, SYSTOLIC: ICD-10-CM

## 2025-01-06 DIAGNOSIS — I48.21 PERMANENT ATRIAL FIBRILLATION (MULTI): ICD-10-CM

## 2025-01-06 DIAGNOSIS — I25.10 ARTERIOSCLEROSIS OF CORONARY ARTERY: ICD-10-CM

## 2025-01-06 DIAGNOSIS — I10 ESSENTIAL HYPERTENSION: ICD-10-CM

## 2025-01-06 DIAGNOSIS — E78.2 MIXED HYPERLIPIDEMIA: ICD-10-CM

## 2025-01-06 DIAGNOSIS — Z95.810 ICD (IMPLANTABLE CARDIOVERTER-DEFIBRILLATOR) IN PLACE: Primary | ICD-10-CM

## 2025-01-06 DIAGNOSIS — Z71.89 ENCOUNTER FOR MEDICATION REVIEW AND COUNSELING: ICD-10-CM

## 2025-01-06 DIAGNOSIS — I21.9 MYOCARDIAL INFARCTION, UNSPECIFIED MI TYPE, UNSPECIFIED ARTERY (MULTI): ICD-10-CM

## 2025-01-06 PROCEDURE — 93284 PRGRMG EVAL IMPLANTABLE DFB: CPT | Performed by: INTERNAL MEDICINE

## 2025-01-06 RX ORDER — CITALOPRAM 10 MG/1
10 TABLET ORAL DAILY
COMMUNITY

## 2025-01-06 ASSESSMENT — ENCOUNTER SYMPTOMS
OCCASIONAL FEELINGS OF UNSTEADINESS: 1
DYSPNEA ON EXERTION: 0
PALPITATIONS: 0
LOSS OF SENSATION IN FEET: 1
DEPRESSION: 0

## 2025-01-06 NOTE — PROGRESS NOTES
Chief Complaint:   Follow-up     History Of Present Illness:    Arnav Ramirez is a 79 y.o. male presenting with follow-up.  He is accompanied by his wife.    His device pocket healed well after upgrade of device.  He notes no real difference since he is now 100% BiV paced.  Patient denies any arrhythmia symptoms of palpitation, lightheadedness, near syncope, or syncope.    Last Recorded Vitals:  Vitals:    01/06/25 1521   BP: 110/62   BP Location: Left arm   Patient Position: Sitting   Pulse: 102   SpO2: 92%       Past Medical History:  See List    Past Surgical History:  See List       Social History:  He reports that he has never smoked. He has never used smokeless tobacco. He reports current alcohol use of about 5.0 standard drinks of alcohol per week. He reports that he does not currently use drugs.    Family History:  Family History   Problem Relation Name Age of Onset    Colon cancer Mother      Other (cardiac disorder) Father      Heart attack Father      Coronary artery disease Brother      Heart attack Brother          Allergies:  Chloraprep clear [chlorhexidin-isopropyl alcohol], Chlorhexidine, and Spironolactone    Outpatient Medications:  Current Outpatient Medications   Medication Instructions    acetaminophen (TYLENOL) 650 mg, oral, Every 4 hours PRN    aspirin 81 mg, oral, Every other day, Hold for 1 week and resume on 10/2/2024    busPIRone (BUSPAR) 15 mg, 3 times daily    carbidopa-levodopa (Sinemet CR)  mg ER tablet 1 tablet, 4 times daily    citalopram (CELEXA) 30 mg, Daily    citalopram (CELEXA) 10 mg, Daily    cyanocobalamin (VITAMIN B-12) 1,000 mcg, Every 30 days    donepezil (Aricept) 5 mg tablet 1 tablet, Nightly    Dupixent Syringe 300 mg, subcutaneous, Every 14 days    eplerenone (Inspra) 25 mg tablet 1 tablet, Daily    furosemide (LASIX) 20 mg, Every other day    ipratropium (Atrovent) 42 mcg (0.06 %) nasal spray 2 sprays, 2 times daily    Jardiance 10 mg, Daily RT    latanoprost  (Xalatan) 0.005 % ophthalmic solution 1 drop, Nightly    levothyroxine (Synthroid, Levoxyl) 100 mcg tablet 1 tablet, Daily    magnesium oxide 400 mg, Daily RT    metoprolol succinate XL (TOPROL-XL) 75 mg, oral, Every evening    mirtazapine (Remeron) 15 mg tablet Take 1 tablet (15 mg) by mouth once daily at bedtime.    nitroglycerin (NITROSTAT) 0.4 mg, sublingual, Every 5 min PRN    omeprazole (PriLOSEC) 40 mg DR capsule 1 capsule, Daily    psyllium (Metamucil) 0.4 gram capsule 1 capsule, 2 times daily    rosuvastatin (CRESTOR) 10 mg, Every evening    sacubitriL-valsartan (Entresto) 24-26 mg tablet Take 1 tablet by mouth 2 times a day.    traMADol (ULTRAM) 50 mg, oral, Every 8 hours PRN, If no relief from tylenol then alternate tylenol with Tramadol    vit C/vit E ac/selenium/ginkgo (MEMORY COMPLEX ORAL) 1 capsule, Daily    Xarelto 20 mg, oral, Daily with evening meal, Hold for 2 days and resume on 9/28/2024   Review of Systems   Constitutional: Positive for malaise/fatigue.   Cardiovascular:  Negative for chest pain, dyspnea on exertion and palpitations.   All other systems reviewed and are negative.        Physical Exam:  Constitutional:       General: Awake.      Appearance: Normal and healthy appearance. Well-developed and not in distress.   Neck:      Vascular: No JVR. JVD normal.   Pulmonary:      Effort: Pulmonary effort is normal.      Breath sounds: Normal breath sounds. No wheezing. No rhonchi. No rales.   Chest:      Chest wall: Not tender to palpatation.      Comments: Left sided device pocket- healed and well approximated. No swelling or hematoma      Cardiovascular:      PMI at left midclavicular line. Normal rate. Regular rhythm. Normal S1. Normal S2.       Murmurs: There is no murmur.      No gallop.  No click. No rub.   Pulses:     Intact distal pulses.   Edema:     Peripheral edema absent.   Abdominal:      Tenderness: There is no abdominal tenderness.   Musculoskeletal: Normal range of motion.     "     General: No tenderness. Skin:     General: Skin is warm and dry.   Neurological:      General: No focal deficit present.      Mental Status: Alert and oriented to person, place and time.            Last Labs:  CBC -  Lab Results   Component Value Date    WBC 7.2 09/20/2023    HGB 10.9 (L) 09/20/2023    HCT 34.6 (L) 09/20/2023    MCV 93 09/20/2023     (H) 09/20/2023       CMP -  Lab Results   Component Value Date    CALCIUM 9.1 09/25/2024    PHOS 3.7 04/19/2022    PROT 7.1 03/14/2023    ALBUMIN 3.9 03/14/2023    AST 25 03/14/2023    ALT 6 (L) 03/14/2023    ALKPHOS 106 03/14/2023    BILITOT 0.7 03/14/2023       LIPID PANEL -   No results found for: \"CHOL\", \"TRIG\", \"HDL\", \"CHHDL\", \"LDLF\", \"VLDL\", \"NHDL\"    RENAL FUNCTION PANEL -   Lab Results   Component Value Date    GLUCOSE 85 09/25/2024     (L) 09/25/2024    K 4.2 09/25/2024    CL 99 09/25/2024    CO2 20 (L) 09/25/2024    ANIONGAP 16 09/25/2024    BUN 21 09/25/2024    CREATININE 1.20 09/25/2024    GFRMALE 44 (A) 09/20/2023    CALCIUM 9.1 09/25/2024    PHOS 3.7 04/19/2022    ALBUMIN 3.9 03/14/2023        Lab Results   Component Value Date     (H) 04/19/2022       Last Cardiology Tests:  ECG:  ECG 12 lead STAT 09/25/2024    Device check today.  Medtronic DT MA 1Q1 BiV ICD.  Estimate longevity device over 7 years 11 months.  99.5% atrial pacing.  100% 5 ventricular pacing.  0 VT.  All rhythms atrial fibrillation.  Echo:  Transthoracic Echo (TTE) Limited 11/06/2023        Lab review: I have personally reviewed the laboratory result(s) see above    Assessment/Plan   Diagnoses and all orders for this visit:  Arteriosclerosis of coronary artery  Permanent atrial fibrillation (Multi)  CHB (complete heart block)  Chronic systolic congestive heart failure, NYHA class 2  Essential hypertension  Mixed hyperlipidemia  ICD (implantable cardioverter-defibrillator) in place  Ischemic cardiomyopathy  LBBB (left bundle branch block)  Myocardial infarction, " unspecified MI type, unspecified artery (Multi)  Sinus node dysfunction (Multi)  Encounter to discuss treatment options  Encounter for medication review and counseling  Never smoked tobacco  BMI 25.0-25.9,adult        Lauren Bryant RN    Nonischemic and ischemic cardiomyopathy.  NYHA IIIc heart failure.  Awaiting evaluation by Dr. Hooker  LV thrombus. Chronic. Stable. On Xarelto.   Permanent atrial fibrillation, s/p PVI in 2014 and status post watchman and [place] trial.  High risk medication amiodarone. Discontinued once AV node ablation performed.  Paced  ms.  Chronic systolic heart failure. Remote MI. Combined nonischemic and ischemic cardiomyopathy. LVEF 15% by MUGA scan May 2022. New [place] Heart Association 3C chronic systolic heart failure currently his heart failure Guideline Directed Medical Therapy.  Status Post Upgrade to biventricular defibrillator.  Medtronic DT MA 1Q1 BiV ICD.  Reviewed Device Check.  Normal Device Function.  Ordered limited echocardiogram with V V optimization with device.  Ordered Device Checks.  Will Alternate Remote Checks with Device Clinic Paired with EP Office Visit..  Patient has follow-up with  Syncope,chronic, stable, likely secondary to hypovolemia in the past. No recurrence. No arrhythmias by device check at that time.  Noncardiac chest pain. Resolved.  Hypertension, stable, chronic. Episodes of hypotension limited Entresto in the past. Meds adjusted as per cardiology.  Coronary artery disease, chronic. Stable, s/p remote stents x 2, 1998. Remote MI. No symptoms. Reviewed medications. Continue medications. Refills discussed. Follows with cardiology.  CKD stage III. Follows with PCP for blood work.  Prostate Ca, s/p radiation seed implant 2010/  Orthostatic dizziness.  Start compression stockings 20 mmHg.  Prescription given.  AHA recommendations for exercise, diet, and behavioral modification reviewed with pt.     Counseling over 50% visit performed.  The patient,  wife, and I discussed the mechanism of arrhythmia, atrial fibrillation, limited echocardiogram with optimization of device, device clinic, device follow-up, heart failure evaluation follow-up, indications for and types of medications, discussion if and what medication refills needed, treatment options, risks, benefits, and imponderables. American Heart Association lifestyle changes and behavioral modification discussed. All questions answered in detail. Patient and wife appreciative of care.

## 2025-01-06 NOTE — PATIENT INSTRUCTIONS
Continue same medications/treatment.  Patient educated on proper medication use.  Patient educated on risk factor modification.  Please bring any lab results from other providers/physicians to your next appointment.    Please bring all medicines, vitamins, and herbal supplements with you when you come to the office.    Prescriptions will not be filled unless you are compliant with your follow up appointments or have a follow up appointment scheduled as per instruction of your physician. Refills should be requested at the time of your visit.    SCHEDULE limited echo with AV VV optimization  Follow up with Dr. Gasca in 6 months with device check   Continue remote checks at 3 and 9 months    MANI JOHNSON, RN, AM SCRIBING FOR AND IN THE PRESENCE OF DR. RUBEN GASCA MD, FACC, FACP, FHRS

## 2025-01-07 ENCOUNTER — OFFICE VISIT (OUTPATIENT)
Dept: OTOLARYNGOLOGY | Facility: CLINIC | Age: 80
End: 2025-01-07
Payer: MEDICARE

## 2025-01-07 DIAGNOSIS — R13.10 DYSPHAGIA, UNSPECIFIED TYPE: Primary | ICD-10-CM

## 2025-01-07 DIAGNOSIS — R49.0 DYSPHONIA: ICD-10-CM

## 2025-01-07 DIAGNOSIS — G20.A1 PARKINSON'S DISEASE, UNSPECIFIED WHETHER DYSKINESIA PRESENT, UNSPECIFIED WHETHER MANIFESTATIONS FLUCTUATE: ICD-10-CM

## 2025-01-07 PROCEDURE — 31579 LARYNGOSCOPY TELESCOPIC: CPT | Performed by: OTOLARYNGOLOGY

## 2025-01-07 PROCEDURE — 99215 OFFICE O/P EST HI 40 MIN: CPT | Mod: 25 | Performed by: OTOLARYNGOLOGY

## 2025-01-07 ASSESSMENT — PAIN SCALES - GENERAL: PAINLEVEL_OUTOF10: 0-NO PAIN

## 2025-01-07 NOTE — PROGRESS NOTES
Patient: Arnav Ramirez   MRN: [id number] YOB: 1945   Sex: male Age: 79 y.o.  Date of Service: 2025       ASSESSMENT AND PLAN  I discussed the findings with Arnav Ramirez and have recommended the followin. Dysphagia to solids in the setting of Parkinson's  - MBS ordered but not completed, he can do this at [place], I will review once completed  - Swallow therapy referral  - We discussed possible esophagoscopy and dilation but he does not feel like his symptoms are severe enough to warrant intervention at this time    2. Dysphonia, hypophonia - currently not bothered by this.  - Continue to monitor      CHIEF COMPLAINT  Chief Complaint   Patient presents with    Dysphagia       HISTORY OF PRESENT ILLNESS  Arnav Ramirez is a 79 y.o. male referred by Balaji Garcia MD for evaluation of dysphagia.  The patient has a history of Parkinson's, reports dysphagia with solids, previously with all meals, worst around Thanksgiving time, but recently more improved.    25  The dysphagia history includes:      Dysphagia for solids               yes    Dysphagia for liquids              no    Dysphagia for pills                  yes  Associated weight loss            5 lbs due to diet changes    Recent pneumonia/bronchitis  none  GERD/Acid reflux   Yes on omeprazole BID    He did have a dilation in the past in [place], he reports it did help at the time. Per reports EGD in  showed esophageal spasm, no biopsies done, kyleigh monterrosoator. He notes his voice is weak. Some days are better than others. He has occasional shortness of breath.    Dr. Machado 24  Patient c/c of dysphagia occuring in throat/upper esophagus. This occurs daily with every PO intake of solid foods. This results in coughing up the food bolus. He says the food gagas him when he eats   No problem with liquids. Denies chest pain or dysphagia.     PMH: pacemaker/debrillator, watchman, cardiac stents on Xarelto    ADDITIONAL  HISTORY  Past Medical History  He has a past medical history of Arrhythmia, CHF (congestive heart failure), Chronic kidney disease, Coronary artery disease, Hyperlipidemia, Hypertension, Hypothyroid, Intracardiac thrombosis, not elsewhere classified (08/05/2022), Malignant neoplasm of prostate (Multi) (02/12/2014), Other cardiomyopathies (02/12/2014), Other conditions influencing health status (03/07/2022), Other long term (current) drug therapy (09/21/2022), Other specified peripheral vascular diseases (07/20/2022), Personal history of other diseases of the circulatory system (09/21/2022), Personal history of other diseases of the circulatory system (06/30/2022), Personal history of other diseases of the circulatory system (02/12/2014), and Personal history of other specified conditions (02/12/2014). Surgical History  He has a past surgical history that includes Other surgical history (02/12/2014); Other surgical history (04/08/2022); Other surgical history (03/07/2022); Other surgical history (11/19/2021); Other surgical history (11/19/2021); Other surgical history (11/19/2021); Other surgical history (11/19/2021); and Cardiac electrophysiology procedure (Left, 9/25/2024).   Social History  He reports that he has never smoked. He has never used smokeless tobacco. He reports current alcohol use of about 5.0 standard drinks of alcohol per week. He reports that he does not currently use drugs. Allergies  Chloraprep clear [chlorhexidin-isopropyl alcohol], Chlorhexidine, and Spironolactone     Family History  Family History   Problem Relation Name Age of Onset    Colon cancer Mother      Other (cardiac disorder) Father      Heart attack Father      Coronary artery disease Brother      Heart attack Brother          REVIEW OF SYSTEMS  All 10 systems were reviewed and negative except for above.      PHYSICAL EXAM  ENT Physical Exam   GENERAL: Well-nourished and developed, alert and appropriate, no distress, voice  [id number]  RESPIRATORY: Breathing quietly, no stridor  HEAD: Normocephalic atraumatic  FACE: Symmetric, no masses or lesions  EYES:  Pupils reactive, sclera clear, external ocular muscles intact, no nystagmus.    EARS:  Pinnae normal. External auditory canals clear and tympanic membranes intact.  NOSE:  No anterior lesions, masses or polyps.  ORAL CAVITY/OROPHARYNX:  Buccal mucosa is moist without lesions or masses, tongue midline and palate elevates symmetrically. Tongue mobility intact.  NECK:  Soft. There is no lymphadenopathy or thyromegaly.    NEUROLOGIC:  Cranial nerves II-XII grossly intact.       Last Recorded Vitals  There were no vitals taken for this visit.    RESULTS    Patient Reported Outcome Measures  N/A    Laboratory, Radiology, and Pathology  I personally reviewed the following results, with the following interpretation:   Manometry 11/27/24  - The LES pressure is normal at 20 mmHg and it appears to relax normally with a normal median IRP at 15 mmHg. The median IRP in the upright swallows is also normal   - There is no evidence of a hiatal hernia  - The study of the esophageal peristalsis shows normal peristalsis in the upright swallows. The mean DCI is 1785 mmHg-cm-sec. *However 30% were simultaneous and only 20% were peristaltic by conventional metrics  - The impedance study shows complete bolus transit in 70% of the swallows  - UESP elevated at 147      PROCEDURES  Flexible Fiberoptic Laryngoscopy with Stroboscopy    Patient failed a mirror exam due to limitations of equipment and the need for stroboscopy to assess glottic vibration and closure.     PREOPERATIVE DIAGNOSIS: Dysphonia    POSTOPERATIVE DIAGNOSIS: Same    PROCEDURE:  Strobovideolaryngoscopy    ANESTHESIA:  Topical    COMPLICATIONS:  None    SPECIMENS:  None    PROCEDURE IN DETAIL: The patient was seated in an upright position.  The nasal cavity was topically decongested and anesthetized.  The stroboscopic microphone was held to the  neck at the level of the larynx.  The distal chip video laryngoscope was passed through the nasal cavity.  The nasal cavity and nasopharynx were within normal limits except noted below.  The following findings on stroboscopy were noted:      Tongue Base: no masses or lesions   Vocal Fold Mobility               Right VF: mobile               Left VF: mobile   TVF Appearance               Edema/Erythema: none               Lesions/vibratory margin irregularities: atrophy   Glottic Closure Pattern: spindle    Vibration:               Phase: symmetric   Periodicity: regular but limited by breath support               Amplitude: increased                Waveform: increased   Muscle Tension Patterns:  lateral   Other Findings: no significant pooling of secretions    The patient tolerated the procedure well.         ----------------------------------------------------------------------  Ruthie Farfan MD, MAEd    [facility]  Department of Otolaryngology - Head and Neck Surgery  [facility]    The total time I spent in care of this patient today (excluding time spent on other billable services) is as follows:

## 2025-01-08 NOTE — PROGRESS NOTES
Patient: Arnav Ramirez   MRN: [id number] YOB: 1945   Sex: male Age: 79 y.o.  Date of Service: 2025       ASSESSMENT AND PLAN  I discussed the findings with Arnav Ramirez and have recommended the followin. Dysphagia to solids in the setting of Parkinson's  - MBS ordered but not completed, he can do this at [place], I will review once completed  - Swallow therapy referral  - We discussed possible esophagoscopy and dilation but he does not feel like his symptoms are severe enough to warrant intervention at this time    2. Dysphonia, hypophonia, stroboscopy with severe atrophy and spindle gap. We discussed options for laryngoplasty but he notes he is currently not bothered by this.  - Continue to monitor      CHIEF COMPLAINT  Chief Complaint   Patient presents with    Dysphagia       HISTORY OF PRESENT ILLNESS  Arnav Ramirez is a 79 y.o. male referred by Dr. Machado, Balaji ENCISO MD for evaluation of dysphagia.  The patient has a history of Parkinson's, reports dysphagia with solids, previously with all meals, worst around Thanksgiving time, but recently more improved.    25  The dysphagia history includes:      Dysphagia for solids               yes    Dysphagia for liquids              no    Dysphagia for pills                  yes  Associated weight loss            5 lbs due to diet changes    Recent pneumonia/bronchitis  none  GERD/Acid reflux   Yes on omeprazole BID    He did have a dilation in the past in [place], he reports it did help at the time. Per reports EGD in  showed esophageal spasm, no biopsies done, kyleigh monterrosoator. He notes his voice is weak. Some days are better than others. He has occasional shortness of breath.    Dr. Machado 24  Patient c/c of dysphagia occuring in throat/upper esophagus. This occurs daily with every PO intake of solid foods. This results in coughing up the food bolus. He says the food gagas him when he eats   No problem with liquids. Denies chest  pain or dysphagia.     PMH: pacemaker/debrillator, watchman, cardiac stents on Xarelto    ADDITIONAL HISTORY  Past Medical History  He has a past medical history of Arrhythmia, CHF (congestive heart failure), Chronic kidney disease, Coronary artery disease, Hyperlipidemia, Hypertension, Hypothyroid, Intracardiac thrombosis, not elsewhere classified (08/05/2022), Malignant neoplasm of prostate (Multi) (02/12/2014), Other cardiomyopathies (02/12/2014), Other conditions influencing health status (03/07/2022), Other long term (current) drug therapy (09/21/2022), Other specified peripheral vascular diseases (07/20/2022), Personal history of other diseases of the circulatory system (09/21/2022), Personal history of other diseases of the circulatory system (06/30/2022), Personal history of other diseases of the circulatory system (02/12/2014), and Personal history of other specified conditions (02/12/2014). Surgical History  He has a past surgical history that includes Other surgical history (02/12/2014); Other surgical history (04/08/2022); Other surgical history (03/07/2022); Other surgical history (11/19/2021); Other surgical history (11/19/2021); Other surgical history (11/19/2021); Other surgical history (11/19/2021); and Cardiac electrophysiology procedure (Left, 9/25/2024).   Social History  He reports that he has never smoked. He has never used smokeless tobacco. He reports current alcohol use of about 5.0 standard drinks of alcohol per week. He reports that he does not currently use drugs. Allergies  Chloraprep clear [chlorhexidin-isopropyl alcohol], Chlorhexidine, and Spironolactone     Family History  Family History   Problem Relation Name Age of Onset    Colon cancer Mother      Other (cardiac disorder) Father      Heart attack Father      Coronary artery disease Brother      Heart attack Brother          REVIEW OF SYSTEMS  All 10 systems were reviewed and negative except for above.      PHYSICAL EXAM  ENT  Physical Exam   GENERAL: Well-nourished and developed, alert and appropriate, no distress, voice [id number]  RESPIRATORY: Breathing quietly, no stridor  HEAD: Normocephalic atraumatic  FACE: Symmetric, no masses or lesions  EYES:  Pupils reactive, sclera clear, external ocular muscles intact, no nystagmus.    EARS:  Pinnae normal. External auditory canals clear and tympanic membranes intact.  NOSE:  No anterior lesions, masses or polyps.  ORAL CAVITY/OROPHARYNX:  Buccal mucosa is moist without lesions or masses, tongue midline and palate elevates symmetrically. Tongue mobility intact.  NECK:  Soft. There is no lymphadenopathy or thyromegaly.    NEUROLOGIC:  Cranial nerves II-XII grossly intact.       Last Recorded Vitals  There were no vitals taken for this visit.    RESULTS    Patient Reported Outcome Measures  N/A    Laboratory, Radiology, and Pathology  I personally reviewed the following results, with the following interpretation:   Manometry 11/27/24  - The LES pressure is normal at 20 mmHg and it appears to relax normally with a normal median IRP at 15 mmHg. The median IRP in the upright swallows is also normal   - There is no evidence of a hiatal hernia  - The study of the esophageal peristalsis shows normal peristalsis in the upright swallows. The mean DCI is 1785 mmHg-cm-sec. *However 30% were simultaneous and only 20% were peristaltic by conventional metrics  - The impedance study shows complete bolus transit in 70% of the swallows  - UESP elevated at 147      PROCEDURES  Flexible Fiberoptic Laryngoscopy with Stroboscopy    Patient failed a mirror exam due to limitations of equipment and the need for stroboscopy to assess glottic vibration and closure.     PREOPERATIVE DIAGNOSIS: Dysphonia    POSTOPERATIVE DIAGNOSIS: Same    PROCEDURE:  Strobovideolaryngoscopy    ANESTHESIA:  Topical    COMPLICATIONS:  None    SPECIMENS:  None    PROCEDURE IN DETAIL: The patient was seated in an upright position.  The  nasal cavity was topically decongested and anesthetized.  The stroboscopic microphone was held to the neck at the level of the larynx.  The distal chip video laryngoscope was passed through the nasal cavity.  The nasal cavity and nasopharynx were within normal limits except noted below.  The following findings on stroboscopy were noted:      Tongue Base: no masses or lesions   Vocal Fold Mobility               Right VF: mobile               Left VF: mobile   TVF Appearance               Edema/Erythema: none               Lesions/vibratory margin irregularities: atrophy   Glottic Closure Pattern: spindle    Vibration:               Phase: symmetric   Periodicity: regular but limited by breath support               Amplitude: increased                Waveform: increased   Muscle Tension Patterns:  lateral   Other Findings: no significant pooling of secretions    The patient tolerated the procedure well.         ----------------------------------------------------------------------  Ruthie Farfan MD, MAEd    [facility]  Department of Otolaryngology - Head and Neck Surgery  [facility]    The total time I spent in care of this patient today (excluding time spent on other billable services) is as follows:    Time Spent  Prep time on day of patient encounter: 10 minutes  Time spent directly with patient, family or caregiver: 30 minutes  Additional Time Spent on Patient Care Activities: 10 minutes  Documentation Time: 10 minutes  Other Time Spent: 0 minutes  Total: 60 minutes

## 2025-01-14 ENCOUNTER — APPOINTMENT (OUTPATIENT)
Dept: CARDIOLOGY | Facility: HOSPITAL | Age: 80
End: 2025-01-14
Payer: MEDICARE

## 2025-01-15 ENCOUNTER — HOSPITAL ENCOUNTER (OUTPATIENT)
Dept: RADIOLOGY | Facility: HOSPITAL | Age: 80
Discharge: HOME | End: 2025-01-15
Payer: MEDICARE

## 2025-01-15 DIAGNOSIS — R13.12 OROPHARYNGEAL DYSPHAGIA: ICD-10-CM

## 2025-01-15 PROBLEM — R13.19 ESOPHAGEAL DYSPHAGIA: Status: ACTIVE | Noted: 2025-01-15

## 2025-01-15 PROCEDURE — 92611 MOTION FLUOROSCOPY/SWALLOW: CPT | Mod: GN

## 2025-01-15 PROCEDURE — 74230 X-RAY XM SWLNG FUNCJ C+: CPT

## 2025-01-15 PROCEDURE — 2500000005 HC RX 250 GENERAL PHARMACY W/O HCPCS: Performed by: INTERNAL MEDICINE

## 2025-01-15 RX ADMIN — BARIUM SULFATE 30 ML: 400 PASTE ORAL at 11:02

## 2025-01-15 RX ADMIN — BARIUM SULFATE 130 ML: 0.81 POWDER, FOR SUSPENSION ORAL at 11:02

## 2025-01-15 NOTE — PROCEDURES
Outpatient  Modified Barium Swallow Study     Patient Name: Arnav Ramirez  MRN: [id number]  : 1945  Room/bed info not found   Today's Date: 01/15/25   Time in 10:30   Time out 11:00    Modified Barium Swallow Study completed. Informed verbal consent obtained prior to completion of exam. The study was completed per protocol with various liquid barium consistencies, pudding, solids and a 13mm barium tablet. A 1.9 cm or .75 inch (outer diameter) ring was placed on the chin in the lateral view and on the lateral, left side of the neck in the a-p view in order to complete objective measurements during swallowing.  The anatomic structures and function of the oropharynx, larynx, hypopharynx and cervical esophagus were evaluated.    Reason for referral: Dysphagia in solids but pt reported it has recently gotten better and does not feel he is having any symptoms now   Patient hx: Parkinson's disease, dysphonia. GERD. Wife reported esophageal dilation in the past    Respiratory status: room air    Pain: Pain Scale: 0-10  Ratin     Location: N/A  Type: N/A  Action Taken: None needed   Current diet: Regular/thin    SPEECH RECOMMENDATIONS:  Diet recommendations: - Regular (IDDSI Level 7)  - Thin liquids (IDDSI Level 0)  Swallow Strategies: - Small bites  - Small, single sips  - Alternate consistencies  - Add moisture to dry foods  - Upright for all PO intake  - Reflux Precautions    Plan:  SLP Plan: No treatment needs identified at this time for swallow. Recommend consideration of outpatient treatment for voice.   Discussed POC: Patient  Discussed Risks/Benefits: Yes  Patient/Caregiver Agreeable: Yes    Short term goals: established 01/15/25   N/A    Education: SLP provided education to Patient regarding MBSS impressions, recommendations and POC at this time. Verbal understanding and agreement given on all accounts.     Additional medical consult suggested: - ENT - continue to follow up as needed   Repeat  study/discharge plan: Repeat study as clinically indicated per MD or SLP         MBSImP Physiological Component  ORAL PHASE:  Lip Closure - No labial escape/anterior loss of bolus   Tongue Control During Bolus Hold - Posterior escape of less than half of the bolus   Bolus prep/mastication - Timely and efficient mastication skills   Bolus transport/lingual motion - Brisk tongue motion for A-P movement of the bolus   Oral residue - Residue collection on oral structure     PHARYNGEAL PHASE:  Initiation of pharyngeal swallow - Bolus head at posterior laryngeal surface of epiglottis   Soft palate elevation - No bolus between soft palate/pharyngeal wall   Laryngeal elevation - Complete superior movement of thyroid cartilage with contact of arytenoids to epiglottic petiole   Anterior hyoid excursion - Partial anterior movement   Epiglottic movement - Partial inversion  Laryngeal vestibule closure - Incomplete - narrow column of air/contrast in laryngeal vestibule   Pharyngeal stripping wave - Present, however, diminished   Pharyngeal contraction (A/P view) - Complete  Pharyngoesophageal segment opening - Partial distension/partial duration with partial obstruction of flow of bolus   Tongue base retraction - No bolus between tongue base and posterior pharyngeal wall   Pharyngeal residue - Collection of residue within or on the pharyngeal structures   Laryngeal penetration - transient with thin liquids   Aspiration - None   Response to aspiration- N/A  Strategies attempted- N/A    ESOPHAGEAL PHASE:  Esophageal clearance - Esophageal retention with retrograde flow below the pharyngoesophageal segment       SLP Impression statement:   Pt presents with mild oropharyngeal dysphagia characterized by deficits noted above. Swallowing physiology is detailed above. Impairments most impacting swallowing safety and efficiency include esophageal retention. However pt reported he does feel he is currently having symptoms of this. Wife  and patient educated on ways to increase esophageal clearance.       Scales and Outcome Measures:   Rosenbek's Penetration Aspiration Scale (rated based on worst swallow per consistency)  Thin Liquids: 2. PENETRATION that CLEARS - contrast enter airway, above vocal cords, no residue  Puree: 1. NO ASPIRATION & NO PENETRATION - no aspiration, contrast does not enter airway  Soft Solids: 1. NO ASPIRATION & NO PENETRATION - no aspiration, contrast does not enter airway  Solids: 1. NO ASPIRATION & NO PENETRATION - no aspiration, contrast does not enter airway        Functional Oral Intake Scale  Functional Oral Intake Scale: Level 6        total oral diet with multiple consistencies without special preparations but specific food limitations

## 2025-01-17 ENCOUNTER — OFFICE VISIT (OUTPATIENT)
Dept: CARDIOLOGY | Facility: CLINIC | Age: 80
End: 2025-01-17
Payer: MEDICARE

## 2025-01-17 VITALS
SYSTOLIC BLOOD PRESSURE: 93 MMHG | OXYGEN SATURATION: 97 % | BODY MASS INDEX: 24.77 KG/M2 | HEIGHT: 74 IN | DIASTOLIC BLOOD PRESSURE: 63 MMHG | RESPIRATION RATE: 16 BRPM | WEIGHT: 193 LBS | HEART RATE: 79 BPM | TEMPERATURE: 96.6 F

## 2025-01-17 DIAGNOSIS — I50.22 CHRONIC SYSTOLIC CONGESTIVE HEART FAILURE: ICD-10-CM

## 2025-01-17 DIAGNOSIS — I50.22 CHRONIC SYSTOLIC CONGESTIVE HEART FAILURE, NYHA CLASS 2: ICD-10-CM

## 2025-01-17 DIAGNOSIS — I25.5 ISCHEMIC CARDIOMYOPATHY: ICD-10-CM

## 2025-01-17 DIAGNOSIS — I25.2 PAST MYOCARDIAL INFARCTION: Primary | ICD-10-CM

## 2025-01-17 DIAGNOSIS — I51.3 LV (LEFT VENTRICULAR) MURAL THROMBUS: ICD-10-CM

## 2025-01-17 PROCEDURE — 99215 OFFICE O/P EST HI 40 MIN: CPT | Performed by: INTERNAL MEDICINE

## 2025-01-17 RX ORDER — DIGOXIN 125 MCG
125 TABLET ORAL
Qty: 12 TABLET | Refills: 11 | Status: SHIPPED | OUTPATIENT
Start: 2025-01-17 | End: 2026-01-17

## 2025-01-17 NOTE — PATIENT INSTRUCTIONS
To reach Dr. Hooker's office please call [place]: [phone]. Fax [phone]. Call [phone] to schedule an appointment. You may also contact the HF RNs at [email or web address]    Take Entresto and metoprolol at 10pm instead of 6pm  Start digoxin 125mcg daily for 3 days then Monday/Wed/Fri only   Get bloodwork on Jan 28th  We will call on check on you at that point and may adjust the dose or add another medication  Start folic acid/folate daily   Return in 4 months

## 2025-01-17 NOTE — PROGRESS NOTES
Advanced Heart Failure Clinic Note    CHIEF COMPLAINT:    Chief Complaint   Patient presents with    Cardiomyopathy        Primary Care Physician: Kalpesh Velazquez MD  Cardiologist- Campos      HISTORY OF PRESENT ILLNESS:   Arnav Ramirez is a 79 y.o. male with systolic heart failure who presents as a new referral     Briefly his cardiac history began in 1998 when he received PCI, follow up angiogram in 2018 showed  of LAD so no futher PCI. He received ICD in 2008 with generator change in 2015 and upgrade to CRT-D in 9/2024, he was diagnosed with AF and underwent PVI in 2014 and later underwent placement of Watchman as part of a clinical trial. He continued to struggle with AF, was taken off amiodarone, he developed increasing burden of AF and underwent placement of CRT-D in 2024 although not sure that he has much improvement, he is scheduled for a CRT optimization echo.     Previously was following with Dr Burr but decided not to follow up in 2022. No admissions for HF since 2023    Currently taking Entresto 24/26, failed increasing 49/51, metoprolol 75mg, Jardiance 10 mg, eplerenone 25mg,   Currently not taking furosemide although it is prescribed as needed for swelling. He rarely takes NTG, not in the past year,     Overall he is very tired and short of breath, denies swelling, bloating orthopnea, can only stand for a short period of time. Appetite is fair he frequently has low blood pressure denies much in the way of orthostatic symptoms however.  He comes to clinic in a wheelchair    He also has severe neuropathy, Parkinsons, he had severe dermatitis currently Dupixent, despite now back on rivaroxiban due to LV thrombus    ICD issues : never been shocked   Monitors/restricts salt/fluid : somewhat  Has scale and weighs self daily: down a bit  Has BP cuff and BPs at home:  100s in the AM drops in evening to 70s      Allergies   Allergen Reactions    Chloraprep Clear [Chlorhexidin-Isopropyl Alcohol] Unknown     Chlorhexidine Unknown    Spironolactone Unknown       CURRENT MEDICATIONS:    Current Outpatient Medications   Medication Sig Dispense Refill    acetaminophen (Tylenol) 325 mg tablet Take 2 tablets (650 mg) by mouth every 4 hours if needed for mild pain (1 - 3) or moderate pain (4 - 6).      aspirin 81 mg EC tablet Take 1 tablet (81 mg) by mouth every other day. Hold for 1 week and resume on 10/2/2024      busPIRone (Buspar) 15 mg tablet Take 1 tablet (15 mg) by mouth 3 times a day.      carbidopa-levodopa (Sinemet CR)  mg ER tablet Take 1 tablet by mouth 4 times a day.      citalopram (CeleXA) 10 mg tablet Take 1 tablet (10 mg) by mouth once daily.      citalopram (CeleXA) 20 mg tablet Take 1.5 tablets (30 mg) by mouth once daily.      cyanocobalamin (Vitamin B-12) 1,000 mcg/mL injection Inject 1 mL (1,000 mcg) into the muscle every 30 (thirty) days.      donepezil (Aricept) 5 mg tablet Take 1 tablet (5 mg) by mouth once daily at bedtime.      dupilumab (Dupixent Syringe) 300 mg/2 mL prefilled syringe Inject 1 Syringe (300 mg) under the skin every 14 (fourteen) days. 12 mL 3    eplerenone (Inspra) 25 mg tablet Take 1 tablet (25 mg) by mouth once daily.      furosemide (Lasix) 20 mg tablet Take 1 tablet (20 mg) by mouth every other day. As Needed (Patient not taking: Reported on 1/17/2025)      ipratropium (Atrovent) 42 mcg (0.06 %) nasal spray Administer 2 sprays into each nostril 2 times a day.      Jardiance 10 mg Take 1 tablet (10 mg) by mouth once daily.      latanoprost (Xalatan) 0.005 % ophthalmic solution Administer 1 drop into both eyes once daily at bedtime.      levothyroxine (Synthroid, Levoxyl) 100 mcg tablet Take 1 tablet (100 mcg) by mouth once daily.      magnesium oxide 400 mg magnesium capsule Take 1 capsule (400 mg) by mouth once daily.      metoprolol succinate XL (Toprol-XL) 25 mg 24 hr tablet Take 3 tablets (75 mg) by mouth once daily in the evening.      mirtazapine (Remeron) 15 mg  tablet Take 1 tablet (15 mg) by mouth once daily at bedtime.      nitroglycerin (Nitrostat) 0.4 mg SL tablet Place 1 tablet (0.4 mg) under the tongue every 5 minutes if needed for chest pain. 90 tablet 3    omeprazole (PriLOSEC) 40 mg DR capsule Take 1 capsule (40 mg) by mouth once daily.      psyllium (Metamucil) 0.4 gram capsule Take 1 capsule by mouth 2 times a day.      rosuvastatin (Crestor) 10 mg tablet Take 1 tablet (10 mg) by mouth once daily in the evening.      sacubitriL-valsartan (Entresto) 24-26 mg tablet Take 1 tablet by mouth 2 times a day.      traMADol (Ultram) 50 mg tablet Take 1 tablet (50 mg) by mouth every 8 hours if needed for moderate pain (4 - 6) or severe pain (7 - 10). If no relief from tylenol then alternate tylenol with Tramadol 10 tablet 0    vit C/vit E ac/selenium/ginkgo (MEMORY COMPLEX ORAL) Take 1 capsule by mouth once daily. (Prevagen)      Xarelto 20 mg tablet Take 1 tablet (20 mg) by mouth once daily in the evening. Take with meals. Hold for 2 days and resume on 9/28/2024       No current facility-administered medications for this visit.         Objective    Problems:   Patient Active Problem List   Diagnosis    Arteriosclerosis of coronary artery    Atrial fibrillation (Multi)    Atrial flutter (Multi)    CHB (complete heart block)    Chronic systolic congestive heart failure, NYHA class 2    Dyspnea    History of prostate cancer    Hyperlipidemia    ICD (implantable cardioverter-defibrillator) in place    Ischemic cardiomyopathy    Past myocardial infarction    Presence of Watchman left atrial appendage closure device    Stage 3b chronic kidney disease (Multi)    Systolic congestive heart failure    Weakness    Anemia    Atopic dermatitis    Elevated IgE level    Research study patient    Sinus node dysfunction (Multi)    LV (left ventricular) mural thrombus    Essential hypertension    Myocardial infarction (Multi)    Parkinson disease (Multi)    BMI 24.0-24.9, adult    Never  smoked tobacco    Encounter for medication review and counseling    Encounter to discuss treatment options    LBBB (left bundle branch block)    Preop cardiovascular exam    Other fatigue    BMI 25.0-25.9,adult    Fatigue    Esophageal dysphagia     Medical History:   Past Medical History:   Diagnosis Date    Arrhythmia     CHF (congestive heart failure)     Chronic kidney disease     Coronary artery disease     Hyperlipidemia     Hypertension     Hypothyroid     Intracardiac thrombosis, not elsewhere classified 08/05/2022    LV (left ventricular) mural thrombus    Malignant neoplasm of prostate (Multi) 02/12/2014    Prostate CA    Other cardiomyopathies 02/12/2014    Cardiomyopathy, nonischemic    Other conditions influencing health status 03/07/2022    Patient new to provider    Other long term (current) drug therapy 09/21/2022    High risk medication use    Other specified peripheral vascular diseases 07/20/2022    Acrocyanosis    Personal history of other diseases of the circulatory system 09/21/2022    History of hypertension    Personal history of other diseases of the circulatory system 06/30/2022    History of atrial tachycardia    Personal history of other diseases of the circulatory system 02/12/2014    Personal history of coronary atherosclerosis    Personal history of other specified conditions 02/12/2014    History of palpitations      has a past medical history of Arrhythmia, CHF (congestive heart failure), Chronic kidney disease, Coronary artery disease, Hyperlipidemia, Hypertension, Hypothyroid, Intracardiac thrombosis, not elsewhere classified (08/05/2022), Malignant neoplasm of prostate (Multi) (02/12/2014), Other cardiomyopathies (02/12/2014), Other conditions influencing health status (03/07/2022), Other long term (current) drug therapy (09/21/2022), Other specified peripheral vascular diseases (07/20/2022), Personal history of other diseases of the circulatory system (09/21/2022), Personal  history of other diseases of the circulatory system (06/30/2022), Personal history of other diseases of the circulatory system (02/12/2014), and Personal history of other specified conditions (02/12/2014).  Surgical Hx:   Past Surgical History:   Procedure Laterality Date    CARDIAC ELECTROPHYSIOLOGY PROCEDURE Left 9/25/2024    Procedure: ICD UPGRADE, DUAL TO BIV;  Surgeon: Awa Gasca MD;  Location: [facility];  Service: Electrophysiology;  Laterality: Left;  Hold Aspirin for 1 week.  Hold Jardiance for 4 days.  Hold Xarelto for 3 days-bridge with Lovenox.    OTHER SURGICAL HISTORY  02/12/2014    Implantable Cardioverter-Defibrillator    OTHER SURGICAL HISTORY  04/08/2022    Cardioversion    OTHER SURGICAL HISTORY  03/07/2022    Atrial appendage closure device insertion    OTHER SURGICAL HISTORY  11/19/2021    Cardiac catheterization    OTHER SURGICAL HISTORY  11/19/2021    Complete colonoscopy    OTHER SURGICAL HISTORY  11/19/2021    Cholecystectomy    OTHER SURGICAL HISTORY  11/19/2021    Percutaneous transluminal coronary angioplasty       has a past surgical history that includes Other surgical history (02/12/2014); Other surgical history (04/08/2022); Other surgical history (03/07/2022); Other surgical history (11/19/2021); Other surgical history (11/19/2021); Other surgical history (11/19/2021); Other surgical history (11/19/2021); and Cardiac electrophysiology procedure (Left, 9/25/2024).  Social Hx:   Tobacco Use: Low Risk  (1/17/2025)    Patient History     Smoking Tobacco Use: Never     Smokeless Tobacco Use: Never     Passive Exposure: Not on file     Alcohol Use: Alcohol Misuse (5/31/2024)    [facility] from [facility]    AUDIT-C     Frequency of Alcohol Consumption: 4 or more times a week     Average Number of Drinks: 5 or 6     Frequency of Binge Drinking: Never      reports that he has never smoked. He has never used smokeless tobacco. He reports current alcohol use of about 5.0  "standard drinks of alcohol per week. He reports that he does not currently use drugs.   Family Hx:   Family History   Problem Relation Name Age of Onset    Colon cancer Mother      Other (cardiac disorder) Father      Heart attack Father      Coronary artery disease Brother      Heart attack Brother       family history includes Colon cancer in his mother; Coronary artery disease in his brother; Heart attack in his brother and father; cardiac disorder in his father.  Allergies:   Allergies   Allergen Reactions    Chloraprep Clear [Chlorhexidin-Isopropyl Alcohol] Unknown    Chlorhexidine Unknown    Spironolactone Unknown          PHYSICAL EXAM:   Body mass index is 24.78 kg/m².    TRENDS:   Vitals:       2024     3:08 PM 2024    10:56 AM 10/2/2024    10:47 AM 2024    12:49 PM 2024     1:00 PM 2025     3:21 PM 2025     1:41 PM   Vitals   Systolic 108 113 100 84 84 110 93   Diastolic 69 67 67 60 58 62 63   BP Location Left arm  Left arm   Left arm    Heart Rate 76 86 76 81 79 102 79   Temp   36.1 °C (97 °F)    35.9 °C (96.6 °F)   Resp    15 16  16   Height  1.88 m (6' 2\")     1.88 m (6' 2\")   Weight (lb) --  202   -- 193   BMI  24.23 kg/m2 25.94 kg/m2    24.78 kg/m2   BSA (m2)  2.11 m2 2.19 m2    2.14 m2   Visit Report Report Report    Report Report     WEIGHT TREND:   Wt Readings from Last 5 Encounters:   25 87.5 kg (193 lb)   10/02/24 91.6 kg (202 lb)   24 85.6 kg (188 lb 11.2 oz)   23 84.4 kg (186 lb)   23 84.4 kg (186 lb)       Objective      79 y.o.year old male, awake alert orient X 3 in NAD, cvp 7cm with ajr, heart regular with soft systolic murmur, lungs clear, no edema, cold extremeties.     LABS:    RESUFAST(CHOL:1,HDL:1,LDLF:1,TRI): No results found for: \"CHOL\", \"HDL\", \"LDLF\", \"TRIG\"  GETLABS(6M,2):   Admission on 2024, Discharged on 2024   Component Date Value    Glucose 2024 85     Sodium 2024 131 (L)     Potassium 2024 " 4.2     Chloride 09/25/2024 99     Bicarbonate 09/25/2024 20 (L)     Anion Gap 09/25/2024 16     Urea Nitrogen 09/25/2024 21     Creatinine 09/25/2024 1.20     eGFR 09/25/2024 61     Calcium 09/25/2024 9.1     Ventricular Rate 09/25/2024 88     Atrial Rate 09/25/2024 88     [place] Interval 09/25/2024 152     QRS Duration 09/25/2024 76     QT Interval 09/25/2024 392     QTC Calculation(Bazett) 09/25/2024 474     P Axis 09/25/2024 118     R Axis 09/25/2024 -83     T Axis 09/25/2024 110     QRS Count 09/25/2024 14     Q Onset 09/25/2024 220     P Onset 09/25/2024 144     P Offset 09/25/2024 218     T Offset 09/25/2024 416     QTC Fredericia 09/25/2024 445     Ventricular Rate 09/25/2024 70     Atrial Rate 09/25/2024 74     QRS Duration 09/25/2024 132     QT Interval 09/25/2024 484     QTC Calculation(Bazett) 09/25/2024 522     R Axis 09/25/2024 221     T Axis 09/25/2024 -10     QRS Count 09/25/2024 11     Q Onset 09/25/2024 195     T Offset 09/25/2024 437     QTC Fredericia 09/25/2024 509    Orders Only on 09/18/2024   Component Date Value    [facility] Glucose 09/18/2024 81     [facility] Blood Urea Ni* 09/18/2024 19     [facility] Creatinine 09/18/2024 1.45 (H)     [facility] ESTIMATED GFR 09/18/2024 48.412     [facility] Sodium 09/18/2024 133 (L)     [facility] Potassium 09/18/2024 4.4     [facility] Chloride 09/18/2024 99     [facility] Carbon Dioxide 09/18/2024 25.0     [facility] Anion Gap 09/18/2024 13.4     [facility] Calcium 09/18/2024 9.0     [facility] Prothrombin T* 09/18/2024 15.3 (H)     [facility] INR 09/18/2024 1.3     [facility] White Blood C* 09/18/2024 5.0     [facility] Uncorrected W* 09/18/2024 5.0     [facility] Red Blood Cou* 09/18/2024 3.46 (L)     [facility] Hemoglobin 09/18/2024 13.4     [facility] Hematocrit 09/18/2024 39.6     [facility] Mean Corpuscu* 09/18/2024 114.5 (H)     [facility] Mean Corpuscu* 09/18/2024 38.6 (H)     [facility] Mean Corpuscu* 09/18/2024 33.7     [facility] Red Cell  Dist* 09/18/2024 14.7     [facility] Platelet Count 09/18/2024 323     [facility] Mean Platelet* 09/18/2024 7.3     [facility] Neutrophils %* 09/18/2024 56.2     [facility] Lymphocytes %* 09/18/2024 21.5     [facility] Monocytes % (* 09/18/2024 14.0     [facility] Eosinophils %* 09/18/2024 6.3     [facility] Basophils % (* 09/18/2024 2.0     [facility] NRBC% 09/18/2024 0.2     [facility] Neutrophils #* 09/18/2024 2.8     [facility] Lymphocytes #* 09/18/2024 1.1     [facility] Monocytes # (* 09/18/2024 0.7     [facility] Eosinophils #* 09/18/2024 0.3     [facility] Basophils # (* 09/18/2024 0.1     [facility] Polychromasia 09/18/2024 Slight     [facility] Poikilocytosis 09/18/2024 Slight     [facility] Anisocytosis 09/18/2024 Moderate     [facility] Macrocytosis 09/18/2024 Moderate     [facility] Tear Drop Nettie* 09/18/2024 Slight     [facility] Ovalocytes 09/18/2024 Slight     [facility] Platelet Janet* 09/18/2024 Normal     [facility] Platelet Morp* 09/18/2024 Normal      LABBRIEF(HGB:3)   Lab Results   Component Value Date    HGB 10.9 (L) 09/20/2023    HGB 10.1 (L) 03/14/2023    HGB 14.3 04/19/2022       CMP:  Recent Labs     09/25/24  0820 09/20/23  0745 03/14/23  1244 04/19/22  1539 03/21/22  0750 01/11/22  0908   * 138 137 138 136 140   K 4.2 4.0 4.0 4.5 4.4 5.5*   CL 99 105 101 101 100 103   CO2 20* 26 26 25 29 34*   ANIONGAP 16 11 14 17 11 9*   BUN 21 23 21 12 17 13   CREATININE 1.20 1.58* 1.24 1.32* 1.52* 0.89   EGFR 61  --   --   --   --   --    MG  --   --   --   --   --  2.34     Recent Labs     03/14/23  1244 04/19/22  1539   ALBUMIN 3.9 3.8  3.8   ALKPHOS 106 91   ALT 6* 22   AST 25 34   BILITOT 0.7 0.6     CBC:  Recent Labs     09/20/23  0745 03/14/23  1244 04/19/22  1539 01/11/22  0908 01/06/22  1115   WBC 7.2 6.4 10.3 6.9 5.7   HGB 10.9* 10.1* 14.3 13.1* 14.4   HCT 34.6* 32.6* 45.7 39.4* 44.0   * 442 488* 315 375   MCV 93 103* 112* 110* 112*     HEME/ENDO:  Recent Labs     03/14/23  1244  "  IRONSAT 13*      CARDIAC:   Recent Labs     04/19/22  1539   *   No results for input(s): \"CHOL\", \"LDLF\", \"HDL\", \"TRIG\" in the last 27868 hours.    BNP   Date Value Ref Range Status   04/19/2022 373 (H) 0 - 99 pg/mL Final     Comment:     .  <100 pg/mL - Heart failure unlikely  100-299 pg/mL - Intermediate probability of acute heart  .               failure exacerbation. Correlate with clinical  .               context and patient history.    >=300 pg/mL - Heart Failure likely. Correlate with clinical  .               context and patient history.  BNP testing is performed using different testing   methodology at [facility] than at other   [facility] hospitals. Direct result comparisons should   only be made within the same method.           DIAGNOSTIC STUDIES REVIEWED:        EKG:    Encounter Date: 09/25/24   ECG 12 lead STAT   Result Value    Ventricular Rate 70    Atrial Rate 74    QRS Duration 132    QT Interval 484    QTC Calculation(Bazett) 522    R Axis 221    T Axis -10    QRS Count 11    Q Onset 195    T Offset 437    QTC Fredericia 509    Narrative    Ventricular-paced rhythm  Biventricular pacemaker detected  Abnormal ECG  When compared with ECG of 25-SEP-2024 08:18, (unconfirmed)  Vent. rate has decreased BY  18 BPM  Confirmed by Kyrie Cope ([id number]) on 9/29/2024 8:12:59 PM       ECHO 2/2024  LVEF mildly dilated, LVEF 15%, akinetic anterior/inferior distal walls, no thrombus noted, mild MR/TR    CORONARY ANGIOGRAM 2018  Mid occluded LAD      ASSESSMENT AND PLAN:   Patient Active Problem List   Diagnosis    Arteriosclerosis of coronary artery    Atrial fibrillation (Multi)    Atrial flutter (Multi)    CHB (complete heart block)    Chronic systolic congestive heart failure, NYHA class 2    Dyspnea    History of prostate cancer    Hyperlipidemia    ICD (implantable cardioverter-defibrillator) in place    Ischemic cardiomyopathy    Past myocardial infarction    Presence of Watchman left " atrial appendage closure device    Stage 3b chronic kidney disease (Multi)    Systolic congestive heart failure    Weakness    Anemia    Atopic dermatitis    Elevated IgE level    Research study patient    Sinus node dysfunction (Multi)    LV (left ventricular) mural thrombus    Essential hypertension    Myocardial infarction (Multi)    Parkinson disease (Multi)    BMI 24.0-24.9, adult    Never smoked tobacco    Encounter for medication review and counseling    Encounter to discuss treatment options    LBBB (left bundle branch block)    Preop cardiovascular exam    Other fatigue    BMI 25.0-25.9,adult    Fatigue    Esophageal dysphagia     Chronic systolic heart failure he is marginally perfused, seems slightly hypervolemic, currently on a good 4 drug regimen of BB/ARNI/SGLT2i/MRA does not appear he can be uptitrated due to low blood pressure. Could consider trial of decrease in BB as he is completely paced and there is limited benefit for BB in the setting of permanent AF.     We will start a trial of digoxin, check a level in a few weeks, may increase at that time, in the future may trial a restart of furosemide (although his symptoms are more related to fatigue/leg weakness than dyspnea), we also discussed a trial of vericiguat    He has undergone CRT with optimization echo scheduled for next week unfortunately he has not had much benefit. Not a candidate for CCM or Barostim due to CRT    Weakness likely multifactorial although his HF is probably a contributor he probably has a degree of neuropathy and deconditioning at this point.     Atrial fibrillation, permanent currently off antiarrhythmic therapy, low likelihood of rhythm control     Coronary artery disease no anginal symptoms, remains on aspirin, statin, most recent angiogram showed occluded LAD with infarction in that territory on nuclear imagin    Macrocytic anemia he has been started on b12 repletion, recommended starting folate repletion given       I  discussed heart failure therapeutic options, including: medical therapy, salt and fluid restriction, the need to monitor BP and weight, exercise and weight loss, need to control hypertension, follow up and medication changes.      I spent  65 minutes coordinating care, reviewing echo/cath/labs and discussing options and adjusting medications.     I discussed with patient and family    I spent >50 percent of the time counseling the patient and discussing the diagnosis, general prognosis and plan of care with the patient       Orders placed during the encounter: No orders of the defined types were placed in this encounter.     Followup Appts:  Future Appointments   Date Time Provider Department Center   1/22/2025  1:00 PM Conor Love MD [id number] [facility]   1/28/2025  7:15 AM IFEOMA [id number] ECHO/VASC 3 [id number] [facility]   8/4/2025  3:20 PM [facility] [id number] [facility]   8/4/2025  3:40 PM Awa Farley MD [id number] [facility]   8/19/2025 10:45 AM Megan Neal MD [id number] [facility]       Delonte Elena IV, MD  Heart Failure, Heart Transplant and  Mechanical Circulatory Support

## 2025-01-22 ENCOUNTER — OFFICE VISIT (OUTPATIENT)
Dept: CARDIOLOGY | Facility: CLINIC | Age: 80
End: 2025-01-22
Payer: MEDICARE

## 2025-01-22 VITALS
HEIGHT: 74 IN | WEIGHT: 193 LBS | SYSTOLIC BLOOD PRESSURE: 89 MMHG | DIASTOLIC BLOOD PRESSURE: 56 MMHG | RESPIRATION RATE: 16 BRPM | OXYGEN SATURATION: 98 % | HEART RATE: 90 BPM | BODY MASS INDEX: 24.77 KG/M2 | TEMPERATURE: 96.8 F

## 2025-01-22 DIAGNOSIS — Z95.818 PRESENCE OF WATCHMAN LEFT ATRIAL APPENDAGE CLOSURE DEVICE: Primary | ICD-10-CM

## 2025-01-22 PROCEDURE — 99213 OFFICE O/P EST LOW 20 MIN: CPT | Performed by: INTERNAL MEDICINE

## 2025-01-22 NOTE — PROGRESS NOTES
Patient is being seen today for 3-year post watchman [place] [employer] trial clinic follow-up visit    For purpose of chart review this is an 79-year-old male with history of coronary disease including anterior myocardial infarction status post PCI of the LAD 98, ischemic cardiomyopathy with severely reduced LV systolic function, status post Medtronic ICD for primary prevention and permanent atrial fibrillation, previous with rhythm control strategy including radiofrequency ablation and antiarrhythmic therapy.    Patient was enrolled in [place] trial and given exception to trial enrollment criteria for his reduced LV systolic function.  He was randomized to device arm and underwent successful device placement with a 27 mm watchman flex device on January 11, 2022.    Post implant CTA imaging demonstrated well-seated left atrial appendage closure device without significant JOSE G device leak.  There is no evidence of device related thrombus.    In the interval period of time since having last seen the patient, he was diagnosed with LV thrombus and placed on anticoagulant therapy.  He is on Xarelto.        ROS:  Constitutional: no fatigue, no fevers, no body aches  Eyes: no acute eye problems, no blurred vision, no diplopia, no eye pain  ENT:  no acute hearing loss, no earache, no sore throat  Cardiovascular: dyspnea on exertion, no chest pain  Respiratory: no chronic cough, not coughing up sputum, no wheezing that is consistent with asthma  Gastrointestinal: no acute bowel complaints  Musculoskeletal: no acute arthralgias, no acute myalgias, no acute joint swelling  Skin: no skin rashes, no change in skin color and pigmentation, no skin lesions and no skin lumps.   Neurological: no headaches, no dizziness, no tingling, no fainting and no limb weakness.   Psychiatric:  no suicidal ideation, no confusion, no personality change and no emotional problems.   Hematologic/Lymphatic: no bleeding issues, other then mentioned in  "HPI  All other systems have been reviewed and are negative for complaint.     Physical Exam:     Visit Vitals  BP 89/56   Pulse 90   Temp 36 °C (96.8 °F)   Resp 16   Ht 1.88 m (6' 2\")   Wt 87.5 kg (193 lb)   SpO2 98%   BMI 24.78 kg/m²   Smoking Status Never   BSA 2.14 m²        Constitutional: alert and in no acute distress.   Eyes: no erythema, swelling or discharge from the eye .   Ears, Nose, Mouth, and Throat: external inspection of ears and nose is normal , lips, teeth, and gums are normal with good dentition  and oropharynx normal with no erythema, edema, exudate or lesions .   Neck: neck is supple, symmetric, trachea midline, no masses  and no thyromegaly .   Pulmonary: no increased work of breathing or signs of respiratory distress , lungs clear to auscultation. , normal percussion of chest  and chest palpation normal .   Cardiovascular: Paced rhythm, no murmur,  no leg edema, non-displaced PMI, no S3 or S4  Abdomen: abdomen non-tender, no masses  and no hepatomegaly .           Skin:  no skin lesions          Neurologic: non-focal neurologic examination.      Psychiatric judgment and insight is normal , oriented to person, place and time , normal mood and affect .       Labs:    Results for orders placed or performed during the hospital encounter of 09/25/24   Basic Metabolic Panel    Collection Time: 09/25/24  8:20 AM   Result Value Ref Range    Glucose 85 74 - 99 mg/dL    Sodium 131 (L) 136 - 145 mmol/L    Potassium 4.2 3.5 - 5.3 mmol/L    Chloride 99 98 - 107 mmol/L    Bicarbonate 20 (L) 21 - 32 mmol/L    Anion Gap 16 10 - 20 mmol/L    Urea Nitrogen 21 6 - 23 mg/dL    Creatinine 1.20 0.50 - 1.30 mg/dL    eGFR 61 >60 mL/min/1.73m*2    Calcium 9.1 8.6 - 10.3 mg/dL   ECG 12 lead STAT    Collection Time: 09/25/24  8:31 AM   Result Value Ref Range    Ventricular Rate 88 BPM    Atrial Rate 88 BPM    VT Interval 152 ms    QRS Duration 76 ms    QT Interval 392 ms    QTC Calculation(Bazett) 474 ms    P Axis 118 " degrees    R Axis -83 degrees    T Axis 110 degrees    QRS Count 14 beats    Q Onset 220 ms    P Onset 144 ms    P Offset 218 ms    T Offset 416 ms    QTC Fredericia 445 ms   ECG 12 lead STAT    Collection Time: 09/25/24  1:44 PM   Result Value Ref Range    Ventricular Rate 70 BPM    Atrial Rate 74 BPM    QRS Duration 132 ms    QT Interval 484 ms    QTC Calculation(Bazett) 522 ms    R Axis 221 degrees    T Axis -10 degrees    QRS Count 11 beats    Q Onset 195 ms    T Offset 437 ms    QTC Fredericia 509 ms          Medications:    Current Outpatient Medications   Medication Instructions    acetaminophen (TYLENOL) 650 mg, oral, Every 4 hours PRN    aspirin 81 mg, oral, Every other day, Hold for 1 week and resume on 10/2/2024    busPIRone (BUSPAR) 15 mg, 3 times daily    carbidopa-levodopa (Sinemet CR)  mg ER tablet 1 tablet, 4 times daily    citalopram (CELEXA) 30 mg, Daily    citalopram (CELEXA) 10 mg, Daily    cyanocobalamin (VITAMIN B-12) 1,000 mcg, Every 30 days    digoxin (LANOXIN) 125 mcg, oral, Every Mon/Wed/Fri    donepezil (Aricept) 5 mg tablet 1 tablet, Nightly    Dupixent Syringe 300 mg, subcutaneous, Every 14 days    eplerenone (Inspra) 25 mg tablet 1 tablet, Daily    furosemide (LASIX) 20 mg, Every other day    ipratropium (Atrovent) 42 mcg (0.06 %) nasal spray 2 sprays, 2 times daily    Jardiance 10 mg, Daily RT    latanoprost (Xalatan) 0.005 % ophthalmic solution 1 drop, Nightly    levothyroxine (Synthroid, Levoxyl) 100 mcg tablet 1 tablet, Daily    magnesium oxide 400 mg, Daily RT    metoprolol succinate XL (TOPROL-XL) 75 mg, oral, Every evening    mirtazapine (Remeron) 15 mg tablet Take 1 tablet (15 mg) by mouth once daily at bedtime.    nitroglycerin (NITROSTAT) 0.4 mg, sublingual, Every 5 min PRN    omeprazole (PriLOSEC) 40 mg DR capsule 1 capsule, Daily    psyllium (Metamucil) 0.4 gram capsule 1 capsule, 2 times daily    rosuvastatin (CRESTOR) 10 mg, Every evening    sacubitriL-valsartan  (Entresto) 24-26 mg tablet Take 1 tablet by mouth 2 times a day.    traMADol (ULTRAM) 50 mg, oral, Every 8 hours PRN, If no relief from tylenol then alternate tylenol with Tramadol    vit C/vit E ac/selenium/ginkgo (MEMORY COMPLEX ORAL) 1 capsule, Daily    Xarelto 20 mg, oral, Daily with evening meal, Hold for 2 days and resume on 9/28/2024          Assessmen/Plant:    This is an 79-year-old male with multiple medical issues who is being seen for 3-year follow-up after undergoing left atrial appendage closure with the Watchman device as part of the [place] [employer] trial.  Patient is doing well clinically and has not had any neurologic events since the time of this procedure.  Device was well-positioned upon imaging and there was no evidence of peridevice leak.  Patient was however transition to anticoagulant therapy after being found to have LV thrombus.    He will continue follow-up with Dr. Hooker and Dr. Gasca.     Conor Love MD  , Interventional Cardiology Fellowship Program   [facility]   [facility] [employer] [facility]  Office Phone Number: [phone]

## 2025-01-27 ENCOUNTER — DOCUMENTATION (OUTPATIENT)
Dept: CARDIOLOGY | Facility: CLINIC | Age: 80
End: 2025-01-27
Payer: MEDICARE

## 2025-01-28 ENCOUNTER — HOSPITAL ENCOUNTER (OUTPATIENT)
Dept: CARDIOLOGY | Facility: CLINIC | Age: 80
Discharge: HOME | End: 2025-01-28
Payer: MEDICARE

## 2025-01-28 PROCEDURE — 93308 TTE F-UP OR LMTD: CPT

## 2025-01-29 DIAGNOSIS — I50.22 CHRONIC SYSTOLIC CONGESTIVE HEART FAILURE, NYHA CLASS 2: Primary | ICD-10-CM

## 2025-01-29 LAB
ANION GAP SERPL CALCULATED.4IONS-SCNC: 14 MMOL/L (CALC) (ref 7–17)
BNP SERPL-MCNC: 254 PG/ML
BUN SERPL-MCNC: 15 MG/DL (ref 7–25)
BUN/CREAT SERPL: ABNORMAL (CALC) (ref 6–22)
CALCIUM SERPL-MCNC: 9.1 MG/DL (ref 8.6–10.3)
CHLORIDE SERPL-SCNC: 99 MMOL/L (ref 98–110)
CO2 SERPL-SCNC: 19 MMOL/L (ref 20–32)
CREAT SERPL-MCNC: 0.79 MG/DL (ref 0.7–1.22)
DIGOXIN SERPL-MCNC: <0.5 MCG/L (ref 0.8–2)
EGFRCR SERPLBLD CKD-EPI 2021: 89 ML/MIN/1.73M2
GLUCOSE SERPL-MCNC: 77 MG/DL (ref 65–139)
POTASSIUM SERPL-SCNC: 4.5 MMOL/L (ref 3.5–5.3)
SODIUM SERPL-SCNC: 132 MMOL/L (ref 135–146)

## 2025-01-29 RX ORDER — SACUBITRIL AND VALSARTAN 24; 26 MG/1; MG/1
1 TABLET, FILM COATED ORAL 2 TIMES DAILY
Qty: 60 TABLET | Refills: 11 | Status: SHIPPED | OUTPATIENT
Start: 2025-01-29

## 2025-01-30 LAB
AORTIC VALVE MEAN GRADIENT: 2 MMHG
AORTIC VALVE PEAK VELOCITY: 0.87 M/S
AV PEAK GRADIENT: 3 MMHG
AVA (PEAK VEL): 2.69 CM2
AVA (VTI): 2.09 CM2
EJECTION FRACTION APICAL 4 CHAMBER: 31
EJECTION FRACTION: 28 %
LEFT VENTRICLE INTERNAL DIMENSION DIASTOLE: 5 CM (ref 3.5–6)
LEFT VENTRICULAR OUTFLOW TRACT DIAMETER: 2 CM
LV EJECTION FRACTION BIPLANE: 32 %
MITRAL VALVE E/E' RATIO: 6.3
RIGHT VENTRICLE PEAK SYSTOLIC PRESSURE: 18.8 MMHG

## 2025-01-30 RX ORDER — EPLERENONE 50 MG/1
50 TABLET, FILM COATED ORAL DAILY
Qty: 30 TABLET | Refills: 11 | Status: SHIPPED | OUTPATIENT
Start: 2025-01-30

## 2025-01-30 RX ORDER — DIGOXIN 125 MCG
125 TABLET ORAL DAILY
Qty: 30 TABLET | Refills: 11 | Status: SHIPPED | OUTPATIENT
Start: 2025-01-30 | End: 2026-01-30

## 2025-02-25 ENCOUNTER — TELEPHONE (OUTPATIENT)
Dept: PRIMARY CARE | Facility: CLINIC | Age: 80
End: 2025-02-25
Payer: MEDICARE

## 2025-03-12 DIAGNOSIS — I50.22 CHRONIC SYSTOLIC CONGESTIVE HEART FAILURE, NYHA CLASS 2: Primary | ICD-10-CM

## 2025-03-12 RX ORDER — VERICIGUAT 5 MG/1
5 TABLET, FILM COATED ORAL DAILY
Qty: 30 TABLET | Refills: 3 | Status: SHIPPED | OUTPATIENT
Start: 2025-03-12

## 2025-04-22 ENCOUNTER — HOSPITAL ENCOUNTER (OUTPATIENT)
Dept: CARDIOLOGY | Facility: HOSPITAL | Age: 80
Discharge: HOME | End: 2025-04-22
Payer: MEDICARE

## 2025-04-22 DIAGNOSIS — Z95.810 ICD (IMPLANTABLE CARDIOVERTER-DEFIBRILLATOR) IN PLACE: ICD-10-CM

## 2025-04-22 PROCEDURE — 93296 REM INTERROG EVL PM/IDS: CPT

## 2025-05-02 ENCOUNTER — OFFICE VISIT (OUTPATIENT)
Dept: CARDIOLOGY | Facility: CLINIC | Age: 80
End: 2025-05-02
Payer: MEDICARE

## 2025-05-02 ENCOUNTER — TELEPHONE (OUTPATIENT)
Dept: CARDIOLOGY | Facility: CLINIC | Age: 80
End: 2025-05-02
Payer: MEDICARE

## 2025-05-02 VITALS
WEIGHT: 175 LBS | TEMPERATURE: 97.1 F | HEIGHT: 74 IN | RESPIRATION RATE: 16 BRPM | OXYGEN SATURATION: 98 % | BODY MASS INDEX: 22.46 KG/M2 | SYSTOLIC BLOOD PRESSURE: 102 MMHG | HEART RATE: 72 BPM | DIASTOLIC BLOOD PRESSURE: 40 MMHG

## 2025-05-02 DIAGNOSIS — I50.22 CHRONIC SYSTOLIC CONGESTIVE HEART FAILURE, NYHA CLASS 2: ICD-10-CM

## 2025-05-02 DIAGNOSIS — I25.10 ARTERIOSCLEROSIS OF CORONARY ARTERY: ICD-10-CM

## 2025-05-02 DIAGNOSIS — Z95.810 ICD (IMPLANTABLE CARDIOVERTER-DEFIBRILLATOR) IN PLACE: Primary | ICD-10-CM

## 2025-05-02 PROCEDURE — 99215 OFFICE O/P EST HI 40 MIN: CPT | Performed by: INTERNAL MEDICINE

## 2025-05-02 RX ORDER — METOPROLOL SUCCINATE 50 MG/1
50 TABLET, EXTENDED RELEASE ORAL EVERY EVENING
Qty: 30 TABLET | Refills: 3 | Status: SHIPPED | OUTPATIENT
Start: 2025-05-02

## 2025-05-02 NOTE — PROGRESS NOTES
Advanced Heart Failure Clinic Note    CHIEF COMPLAINT:    No chief complaint on file.       Primary Care Physician: Kalpesh Velazquez MD  Cardiologist- Campos      HISTORY OF PRESENT ILLNESS:   Arnav Ramirez is a 79 y.o. male with systolic heart failure who returns for follow up    Briefly his cardiac history began in 1998 when he received PCI angiogram in 2018 showed  of LAD so no futher PCI. 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 No admissions for HF since 2023 but has ongoing fatigue.     Currently taking Entresto 24/26, failed higher dose, metoprolol 75mg, Jardiance 10 mg, eplerenone 25mg, never takes furosemide or NTG but has both PRN    He was referred to HF clinic given severe fatigue, weakness but denies swelling, bloating orthopnea, can only stand for a short period of time, comes to clinic in a wheelchair. No overt dizziness but minimal walking.     He is also followed by neurology for progressive Parkinson's which complicates his functional status    At his first visit we added both digoxin and vericiguat although he tolerated both of these medicines today resulted in no significant improvement in his symptoms or functional status    Since last visit he is also began developing increasing difficulty swallowing first with food now even with pills in the last week, he is also began having hallucinations he saw his PCP and was referred to palliative care, according to his wife they are considering hospice but have not decided yet he is DNR but his ICD is still active it does not sound like he had really thought about deactivation until today    Weight continues to trend downward, no issues of bleeding he remains on anticoagulation in spite of the Watchman due to the history of LV thrombus    It seems that he has increased his daily alcohol use, in part as a way of  self-medicating    ICD issues : never been shocked   Monitors/restricts salt/fluid : somewhat  Has scale and weighs self daily: down a bit  Has BP cuff and BPs at home:  not really       Allergies   Allergen Reactions    Chloraprep Clear [Chlorhexidin-Isopropyl Alcohol] Unknown    Chlorhexidine Unknown    Spironolactone Unknown       CURRENT MEDICATIONS:    Current Outpatient Medications   Medication Sig Dispense Refill    acetaminophen (Tylenol) 325 mg tablet Take 2 tablets (650 mg) by mouth every 4 hours if needed for mild pain (1 - 3) or moderate pain (4 - 6).      aspirin 81 mg EC tablet Take 1 tablet (81 mg) by mouth every other day. Hold for 1 week and resume on 10/2/2024      busPIRone (Buspar) 15 mg tablet Take 1 tablet (15 mg) by mouth 3 times a day.      carbidopa-levodopa (Sinemet CR)  mg ER tablet Take 1 tablet by mouth 4 times a day.      citalopram (CeleXA) 10 mg tablet Take 1 tablet (10 mg) by mouth once daily.      citalopram (CeleXA) 20 mg tablet Take 1.5 tablets (30 mg) by mouth once daily.      cyanocobalamin (Vitamin B-12) 1,000 mcg/mL injection Inject 1 mL (1,000 mcg) into the muscle every 30 (thirty) days.      digoxin (Lanoxin) 125 MCG tablet Take 1 tablet (125 mcg) by mouth once daily. 30 tablet 11    donepezil (Aricept) 5 mg tablet Take 1 tablet (5 mg) by mouth once daily at bedtime.      dupilumab (Dupixent Syringe) 300 mg/2 mL prefilled syringe Inject 1 Syringe (300 mg) under the skin every 14 (fourteen) days. 12 mL 3    eplerenone (Inspra) 50 mg tablet Take 1 tablet (50 mg) by mouth once daily. 30 tablet 11    furosemide (Lasix) 20 mg tablet Take 1 tablet (20 mg) by mouth every other day. As Needed (Patient not taking: Reported on 1/17/2025)      ipratropium (Atrovent) 42 mcg (0.06 %) nasal spray Administer 2 sprays into each nostril 2 times a day.      Jardiance 10 mg Take 1 tablet (10 mg) by mouth once daily.      latanoprost (Xalatan) 0.005 % ophthalmic solution Administer 1 drop  into both eyes once daily at bedtime.      levothyroxine (Synthroid, Levoxyl) 100 mcg tablet Take 1 tablet (100 mcg) by mouth once daily.      magnesium oxide 400 mg magnesium capsule Take 1 capsule (400 mg) by mouth once daily.      metoprolol succinate XL (Toprol-XL) 25 mg 24 hr tablet Take 3 tablets (75 mg) by mouth once daily in the evening.      mirtazapine (Remeron) 15 mg tablet Take 1 tablet (15 mg) by mouth once daily at bedtime.      nitroglycerin (Nitrostat) 0.4 mg SL tablet Place 1 tablet (0.4 mg) under the tongue every 5 minutes if needed for chest pain. 90 tablet 3    omeprazole (PriLOSEC) 40 mg DR capsule Take 1 capsule (40 mg) by mouth once daily.      psyllium (Metamucil) 0.4 gram capsule Take 1 capsule by mouth 2 times a day.      rosuvastatin (Crestor) 10 mg tablet Take 1 tablet (10 mg) by mouth once daily in the evening.      sacubitriL-valsartan (Entresto) 24-26 mg tablet Take 1 tablet by mouth 2 times a day. 60 tablet 11    traMADol (Ultram) 50 mg tablet Take 1 tablet (50 mg) by mouth every 8 hours if needed for moderate pain (4 - 6) or severe pain (7 - 10). If no relief from tylenol then alternate tylenol with Tramadol 10 tablet 0    vericiguat (Verquvo) 5 mg tablet Take 5 mg by mouth once daily. 30 tablet 3    vit C/vit E ac/selenium/ginkgo (MEMORY COMPLEX ORAL) Take 1 capsule by mouth once daily. (Prevagen)      Xarelto 20 mg tablet Take 1 tablet (20 mg) by mouth once daily in the evening. Take with meals. Hold for 2 days and resume on 9/28/2024       No current facility-administered medications for this visit.         Objective    Problems:   Patient Active Problem List   Diagnosis    Arteriosclerosis of coronary artery    Atrial fibrillation (Multi)    Atrial flutter (Multi)    CHB (complete heart block)    Chronic systolic congestive heart failure, NYHA class 2    Dyspnea    History of prostate cancer    Hyperlipidemia    ICD (implantable cardioverter-defibrillator) in place    Ischemic  cardiomyopathy    Past myocardial infarction    Presence of Watchman left atrial appendage closure device    Stage 3b chronic kidney disease (Multi)    Systolic congestive heart failure    Weakness    Anemia    Atopic dermatitis    Elevated IgE level    Research study patient    Sinus node dysfunction (Multi)    LV (left ventricular) mural thrombus    Essential hypertension    Myocardial infarction (Multi)    Parkinson disease (Multi)    BMI 24.0-24.9, adult    Never smoked tobacco    Encounter for medication review and counseling    Encounter to discuss treatment options    LBBB (left bundle branch block)    Preop cardiovascular exam    Other fatigue    BMI 25.0-25.9,adult    Fatigue    Esophageal dysphagia     Medical History:   Past Medical History:   Diagnosis Date    Arrhythmia     CHF (congestive heart failure)     Chronic kidney disease     Coronary artery disease     Hyperlipidemia     Hypertension     Hypothyroid     Intracardiac thrombosis, not elsewhere classified 08/05/2022    LV (left ventricular) mural thrombus    Malignant neoplasm of prostate (Multi) 02/12/2014    Prostate CA    Other cardiomyopathies 02/12/2014    Cardiomyopathy, nonischemic    Other conditions influencing health status 03/07/2022    Patient new to provider    Other long term (current) drug therapy 09/21/2022    High risk medication use    Other specified peripheral vascular diseases 07/20/2022    Acrocyanosis    Personal history of other diseases of the circulatory system 09/21/2022    History of hypertension    Personal history of other diseases of the circulatory system 06/30/2022    History of atrial tachycardia    Personal history of other diseases of the circulatory system 02/12/2014    Personal history of coronary atherosclerosis    Personal history of other specified conditions 02/12/2014    History of palpitations      has a past medical history of Arrhythmia, CHF (congestive heart failure), Chronic kidney disease, Coronary  artery disease, Hyperlipidemia, Hypertension, Hypothyroid, Intracardiac thrombosis, not elsewhere classified (08/05/2022), Malignant neoplasm of prostate (Multi) (02/12/2014), Other cardiomyopathies (02/12/2014), Other conditions influencing health status (03/07/2022), Other long term (current) drug therapy (09/21/2022), Other specified peripheral vascular diseases (07/20/2022), Personal history of other diseases of the circulatory system (09/21/2022), Personal history of other diseases of the circulatory system (06/30/2022), Personal history of other diseases of the circulatory system (02/12/2014), and Personal history of other specified conditions (02/12/2014).  Surgical Hx:   Past Surgical History:   Procedure Laterality Date    CARDIAC ELECTROPHYSIOLOGY PROCEDURE Left 9/25/2024    Procedure: ICD UPGRADE, DUAL TO BIV;  Surgeon: Awa Gasca MD;  Location: [facility];  Service: Electrophysiology;  Laterality: Left;  Hold Aspirin for 1 week.  Hold Jardiance for 4 days.  Hold Xarelto for 3 days-bridge with Lovenox.    OTHER SURGICAL HISTORY  02/12/2014    Implantable Cardioverter-Defibrillator    OTHER SURGICAL HISTORY  04/08/2022    Cardioversion    OTHER SURGICAL HISTORY  03/07/2022    Atrial appendage closure device insertion    OTHER SURGICAL HISTORY  11/19/2021    Cardiac catheterization    OTHER SURGICAL HISTORY  11/19/2021    Complete colonoscopy    OTHER SURGICAL HISTORY  11/19/2021    Cholecystectomy    OTHER SURGICAL HISTORY  11/19/2021    Percutaneous transluminal coronary angioplasty       has a past surgical history that includes Other surgical history (02/12/2014); Other surgical history (04/08/2022); Other surgical history (03/07/2022); Other surgical history (11/19/2021); Other surgical history (11/19/2021); Other surgical history (11/19/2021); Other surgical history (11/19/2021); and Cardiac electrophysiology procedure (Left, 9/25/2024).  Social Hx:   Tobacco Use: Low Risk  (1/22/2025)     "Patient History     Smoking Tobacco Use: Never     Smokeless Tobacco Use: Never     Passive Exposure: Not on file     Alcohol Use: Alcohol Misuse (5/31/2024)    Received from [facility]    AUDIT-C     Frequency of Alcohol Consumption: 4 or more times a week     Average Number of Drinks: 5 or 6     Frequency of Binge Drinking: Never      reports that he has never smoked. He has never used smokeless tobacco. He reports current alcohol use of about 5.0 standard drinks of alcohol per week. He reports that he does not currently use drugs.   Family Hx:   Family History   Problem Relation Name Age of Onset    Colon cancer Mother      Other (cardiac disorder) Father      Heart attack Father      Coronary artery disease Brother      Heart attack Brother       family history includes Colon cancer in his mother; Coronary artery disease in his brother; Heart attack in his brother and father; cardiac disorder in his father.  Allergies:   Allergies   Allergen Reactions    Chloraprep Clear [Chlorhexidin-Isopropyl Alcohol] Unknown    Chlorhexidine Unknown    Spironolactone Unknown          PHYSICAL EXAM:   There is no height or weight on file to calculate BMI.    TRENDS:   Vitals:       8/20/2024    10:56 AM 10/2/2024    10:47 AM 11/27/2024    12:49 PM 11/27/2024     1:00 PM 1/6/2025     3:21 PM 1/17/2025     1:41 PM 1/22/2025     1:06 PM   Vitals   Systolic 113 100 84 84 110 93 89   Diastolic 67 67 60 58 62 63 56   BP Location  Left arm   Left arm     Heart Rate 86 76 81 79 102 79 90   Temp  36.1 °C (97 °F)    35.9 °C (96.6 °F) 36 °C (96.8 °F)   Resp   15 16  16 16   Height 1.88 m (6' 2\")     1.88 m (6' 2\") 1.88 m (6' 2\")   Weight (lb)  202   -- 193 193   BMI 24.23 kg/m2 25.94 kg/m2    24.78 kg/m2 24.78 kg/m2   BSA (m2) 2.11 m2 2.19 m2    2.14 m2 2.14 m2   Visit Report Report    Report Report Report     WEIGHT TREND:   Wt Readings from Last 5 Encounters:   01/22/25 87.5 kg (193 lb)   01/17/25 87.5 kg (193 lb)   10/02/24 91.6 " "kg (202 lb)   24 85.6 kg (188 lb 11.2 oz)   23 84.4 kg (186 lb)       Objective      79 y.o.year old male, awake alert orient X 3 in NAD, cvp 7cm with ajr, heart regular with soft systolic murmur, lungs clear, no edema, cold extremeties.     LABS:    RESUFAST(CHOL:1,HDL:1,LDLF:1,TRI): No results found for: \"CHOL\", \"HDL\", \"LDLF\", \"TRIG\"  GETLABS(6M,2):   Orders Only on 2025   Component Date Value    GLUCOSE 2025 77     UREA NITROGEN (BUN) 2025 15     CREATININE 2025 0.79     EGFR 2025 89     BUN/CREATININE RATIO 2025 SEE NOTE:     SODIUM 2025 132 (L)     POTASSIUM 2025 4.5     CHLORIDE 2025 99     CARBON DIOXIDE 2025 19 (L)     ELECTROLYTE BALANCE 2025 14     CALCIUM 2025 9.1     B TYPE NATRIURETIC PEPTI* 2025 254 (H)     DIGOXIN 2025 <0.5 (L)    Office Visit on 2025   Component Date Value    AV mn grad 2025 2     AV pk gary 2025 0.87     LV Biplane EF 2025 32     LVOT diam 2025 2.00     MV avg E/e' ratio 2025 6.30     LV EF 2025 28     RVSP 2025 18.8     LVIDd 2025 5.00     Aortic Valve Area by Con* 2025 2.69     AV pk grad 2025 3     Aortic Valve Area by Con* 2025 2.09     LV A4C EF 2025 31.0      LABBRIEF(HGB:3)   Lab Results   Component Value Date    HGB 10.9 (L) 2023    HGB 10.1 (L) 2023    HGB 14.3 2022       CMP:  Recent Labs     25  0806 24  0820 23  0745 23  1244 22  1539 22  0750 22  0908   * 131* 138 137 138   < > 140   K 4.5 4.2 4.0 4.0 4.5   < > 5.5*   CL 99 99 105 101 101   < > 103   CO2 19* 20* 26 26 25   < > 34*   ANIONGAP 14 16 11 14 17   < > 9*   BUN 15 21 23 21 12   < > 13   CREATININE 0.79 1.20 1.58* 1.24 1.32*   < > 0.89   EGFR 89 61  --   --   --   --   --    MG  --   --   --   --   --   --  2.34    < > = values in this interval not displayed.     Recent " "Labs     03/14/23  1244 04/19/22  1539   ALBUMIN 3.9 3.8  3.8   ALKPHOS 106 91   ALT 6* 22   AST 25 34   BILITOT 0.7 0.6     CBC:  Recent Labs     09/20/23  0745 03/14/23  1244 04/19/22  1539 01/11/22  0908 01/06/22  1115   WBC 7.2 6.4 10.3 6.9 5.7   HGB 10.9* 10.1* 14.3 13.1* 14.4   HCT 34.6* 32.6* 45.7 39.4* 44.0   * 442 488* 315 375   MCV 93 103* 112* 110* 112*     HEME/ENDO:  Recent Labs     03/14/23  1244   IRONSAT 13*      CARDIAC:   Recent Labs     01/28/25  0806 04/19/22  1539   * 373*   No results for input(s): \"CHOL\", \"LDLF\", \"HDL\", \"TRIG\" in the last 94681 hours.    B TYPE NATRIURETIC PEPTIDE (BNP)   Date Value Ref Range Status   01/28/2025 254 (H) <100 pg/mL Final     Comment:        BNP levels increase with age in the general  population with the highest values seen in  individuals greater than 75 years of age.  Reference: J. Am. Missael. Cardiol. 2002; 40:976-982.          BNP   Date Value Ref Range Status   04/19/2022 373 (H) 0 - 99 pg/mL Final     Comment:     .  <100 pg/mL - Heart failure unlikely  100-299 pg/mL - Intermediate probability of acute heart  .               failure exacerbation. Correlate with clinical  .               context and patient history.    >=300 pg/mL - Heart Failure likely. Correlate with clinical  .               context and patient history.  BNP testing is performed using different testing   methodology at [facility] than at other   [facility] hospitals. Direct result comparisons should   only be made within the same method.           DIAGNOSTIC STUDIES REVIEWED:        EKG:    No results found for this or any previous visit (from the past 4464 hours).      ECHO 2/2024  LVEF mildly dilated, LVEF 15%, akinetic anterior/inferior distal walls, no thrombus noted, mild MR/TR    1/2025   1. Entire apex, mid and apical inferior septum, and basal and mid anterior septum are abnormal.   2. Left ventricular ejection fraction is severely decreased, by visual " estimate at 25-30%.   3. Abnormal wall motion.   4. Left ventricular diastolic filling was indeterminate.   5. V-V optimization study done with the following changes:      LV-RV 0ms changed to RV-LV by 40ms      Effective CRT during Afib turned off.   6. Mild to moderate mitral valve regurgitation.   7. There is moderately increased posterior left ventricular wall thickness.    CORONARY ANGIOGRAM 2018  Mid occluded LAD      ASSESSMENT AND PLAN:   Patient Active Problem List   Diagnosis    Arteriosclerosis of coronary artery    Atrial fibrillation (Multi)    Atrial flutter (Multi)    CHB (complete heart block)    Chronic systolic congestive heart failure, NYHA class 2    Dyspnea    History of prostate cancer    Hyperlipidemia    ICD (implantable cardioverter-defibrillator) in place    Ischemic cardiomyopathy    Past myocardial infarction    Presence of Watchman left atrial appendage closure device    Stage 3b chronic kidney disease (Multi)    Systolic congestive heart failure    Weakness    Anemia    Atopic dermatitis    Elevated IgE level    Research study patient    Sinus node dysfunction (Multi)    LV (left ventricular) mural thrombus    Essential hypertension    Myocardial infarction (Multi)    Parkinson disease (Multi)    BMI 24.0-24.9, adult    Never smoked tobacco    Encounter for medication review and counseling    Encounter to discuss treatment options    LBBB (left bundle branch block)    Preop cardiovascular exam    Other fatigue    BMI 25.0-25.9,adult    Fatigue    Esophageal dysphagia     Chronic systolic heart failure he is marginally perfused, but seems euvolemic suspect due to decreased oral intake unable to increase any of his medications at this time given his blood pressure he appears to have a secondary vasodilatory component which is likely contributing to his low blood pressures possibly due to his Parkinson's    Although his heart failure is likely contributing to his overall symptomatology is  not likely the primary etiology probably his Parkinson's    We had a long discussion about his prognosis first of all all it is reasonable to consider hospice and palliative care, additionally his ICD is still on he was agreeable to deactivation I messaged the device clinic and his primary EP about setting that up    We also will reduce some of his medications in an effort to decrease his total pill burden will stop digoxin and vericiguat given lack of benefit reduce Entresto and metoprolol to 1 pill once a day    I did not schedule a follow-up visit given lack of evident benefit but he can call with questions if he has clinical worsening we could reduce his medications further    Atrial fibrillation, permanent currently off antiarrhythmic therapy, low likelihood of rhythm control we will decrease his beta-blocker as above particular given the lack of overt benefit    He remains on anticoagulation primarily due to previous LV thrombus that could also be discontinued    Coronary artery disease no anginal symptoms, remains on aspirin, statin, most recent angiogram showed occluded LAD with infarction in that territory on nuclear imaging    Macrocytic anemia he has been started on b12 repletion, suspect some contribution of alcohol use      I discussed heart failure therapeutic options, including: medical therapy, salt and fluid restriction, the need to monitor BP and weight, exercise and weight loss, need to control hypertension, follow up and medication changes.      I spent  44 minutes coordinating care, reviewing echo/cath/labs and discussing options and adjusting medications.     I discussed with patient family and other providers    I spent >50 percent of the time counseling the patient and discussing the diagnosis, general prognosis and plan of care with the patient       Orders placed during the encounter: No orders of the defined types were placed in this encounter.     Followup Appts:  Future Appointments    Date Time Provider Department [place]   5/2/2025  1:00 PM Delonte Hooker MD [id number] [place]   8/19/2025 10:45 AM Megan Neal MD [id number] [place]   10/6/2025  3:20 PM [facility] [id number] [place]   10/6/2025  3:40 PM Awa Farley MD [id number] [place]       Delonte Elena IV, MD  Heart Failure, Heart Transplant and  Mechanical Circulatory Support

## 2025-05-02 NOTE — PROGRESS NOTES
Per Dr. Gasca, we will deactivate the patient's ICD at the request of the patient. Patient is DNR with palliative care. Order placed and sent to physician for signature.

## 2025-05-02 NOTE — TELEPHONE ENCOUNTER
Called pt's number, number was disconnected, called wife and left vm there. Left pt vm to call back and schedule device check for janes as per [facility]. Left call back number.

## 2025-05-02 NOTE — PATIENT INSTRUCTIONS
To reach Dr. Hooker's office please call: [phone]. Fax [phone]. Call [phone] to schedule testing. You may also use "[employer] or contact the HF RNs at [email or web address]    Stop digoxin, vericiguat and rosuvastatin  Decrease metoprolol to 50mg per day (1 pill), decrease Entresto to 1 pill at night only  Ask Dr Velazquez if you can reduce or stop some of your other medications  We may be able to reduce or stop some of your other heart medications I just don't want to change too much at one  I messaged Dr Gasca about the defibrillator  Call me if you have questions but no scheduled follow up for right now

## 2025-08-04 ENCOUNTER — APPOINTMENT (OUTPATIENT)
Dept: CARDIOLOGY | Facility: CLINIC | Age: 80
End: 2025-08-04
Payer: MEDICARE

## 2025-08-19 ENCOUNTER — APPOINTMENT (OUTPATIENT)
Dept: ALLERGY | Facility: CLINIC | Age: 80
End: 2025-08-19
Payer: MEDICARE

## 2025-10-06 ENCOUNTER — APPOINTMENT (OUTPATIENT)
Dept: CARDIOLOGY | Facility: CLINIC | Age: 80
End: 2025-10-06
Payer: MEDICARE

## (undated) DEVICE — GUIDEWIRE, ANGLE TIP,  .035 DIA, 180 CM, 3 CM TIP"

## (undated) DEVICE — INTRODUCER, HYDROPHILIC, PRELUDE SNAP, 6 FR X 13 CM, GREEN

## (undated) DEVICE — ACCESS KIT, MINI MAK, 5FR X 10CM, 0.018 X 40CM, SS/SS, STANDARD NEEDLE

## (undated) DEVICE — DRESSING, MEPILEX BORDER, POST-OP AG, 4 X 6 IN

## (undated) DEVICE — BLADE, CAUTERY, EXTENDED, PEAK PLASMA

## (undated) DEVICE — HEMOSTAT, D-STAT FLOWABLE

## (undated) DEVICE — SHIELD, RADPAD, ELECTROPHYSIOLOGY, ORANGE, ABSORBENT

## (undated) DEVICE — KIT, WRENCH, STERILE, F/LEADS

## (undated) DEVICE — CATHETER, ABLATION, MARINR RF, MCXL, 7FR X 110CM

## (undated) DEVICE — HEMOSTAT, ARISTA, ABSORBABLE, 1 GRAMS

## (undated) DEVICE — INTRODUCER, HYDROPHILIC, PRELUDE SNAP, 9FR X 13CM, STERILE

## (undated) DEVICE — GUIDEWIRE, WHOLEY, 0.035 IN X 145 CM, MODIFIED J/SHAPEABLE TIP

## (undated) DEVICE — INTRODUCER, HYDROPHILIC, PRELUDE SNAP, 7FR X 25CM, STERILE

## (undated) DEVICE — GUIDEWIRE, UNIVERSAL BALANCE MID WEIGHT, 190CM, STR

## (undated) DEVICE — ENVELOPE, ANTIBACTERIAL, AIGIS RX TYRX, ABSORBABLE, LRG

## (undated) DEVICE — Device